# Patient Record
Sex: FEMALE | Race: WHITE | NOT HISPANIC OR LATINO | Employment: PART TIME | URBAN - METROPOLITAN AREA
[De-identification: names, ages, dates, MRNs, and addresses within clinical notes are randomized per-mention and may not be internally consistent; named-entity substitution may affect disease eponyms.]

---

## 2017-01-11 ENCOUNTER — TRANSCRIBE ORDERS (OUTPATIENT)
Dept: LAB | Facility: CLINIC | Age: 22
End: 2017-01-11

## 2017-01-11 ENCOUNTER — LAB (OUTPATIENT)
Dept: LAB | Facility: CLINIC | Age: 22
End: 2017-01-11
Payer: COMMERCIAL

## 2017-01-11 DIAGNOSIS — N94.6 MENORRHALGIA: ICD-10-CM

## 2017-01-11 DIAGNOSIS — D53.9 UNSPECIFIED DEFICIENCY ANEMIA: Primary | ICD-10-CM

## 2017-01-11 DIAGNOSIS — D53.9 UNSPECIFIED DEFICIENCY ANEMIA: ICD-10-CM

## 2017-01-11 LAB
ALBUMIN SERPL BCP-MCNC: 3.2 G/DL (ref 3.5–5)
ALP SERPL-CCNC: 39 U/L (ref 46–116)
ALT SERPL W P-5'-P-CCNC: 17 U/L (ref 12–78)
ANION GAP SERPL CALCULATED.3IONS-SCNC: 9 MMOL/L (ref 4–13)
AST SERPL W P-5'-P-CCNC: 15 U/L (ref 5–45)
BASOPHILS # BLD AUTO: 0.07 THOUSANDS/ΜL (ref 0–0.1)
BASOPHILS NFR BLD AUTO: 1 % (ref 0–1)
BILIRUB SERPL-MCNC: 0.5 MG/DL (ref 0.2–1)
BUN SERPL-MCNC: 16 MG/DL (ref 5–25)
CALCIUM SERPL-MCNC: 8.8 MG/DL (ref 8.3–10.1)
CHLORIDE SERPL-SCNC: 105 MMOL/L (ref 100–108)
CO2 SERPL-SCNC: 26 MMOL/L (ref 21–32)
CREAT SERPL-MCNC: 0.77 MG/DL (ref 0.6–1.3)
EOSINOPHIL # BLD AUTO: 0.53 THOUSAND/ΜL (ref 0–0.61)
EOSINOPHIL NFR BLD AUTO: 9 % (ref 0–6)
ERYTHROCYTE [DISTWIDTH] IN BLOOD BY AUTOMATED COUNT: 16.9 % (ref 11.6–15.1)
GFR SERPL CREATININE-BSD FRML MDRD: >60 ML/MIN/1.73SQ M
GLUCOSE SERPL-MCNC: 83 MG/DL (ref 65–140)
HCT VFR BLD AUTO: 34.5 % (ref 34.8–46.1)
HGB BLD-MCNC: 10.7 G/DL (ref 11.5–15.4)
IRON SERPL-MCNC: 36 UG/DL (ref 50–170)
LYMPHOCYTES # BLD AUTO: 2.37 THOUSANDS/ΜL (ref 0.6–4.47)
LYMPHOCYTES NFR BLD AUTO: 40 % (ref 14–44)
MCH RBC QN AUTO: 24.7 PG (ref 26.8–34.3)
MCHC RBC AUTO-ENTMCNC: 31 G/DL (ref 31.4–37.4)
MCV RBC AUTO: 80 FL (ref 82–98)
MONOCYTES # BLD AUTO: 0.57 THOUSAND/ΜL (ref 0.17–1.22)
MONOCYTES NFR BLD AUTO: 10 % (ref 4–12)
NEUTROPHILS # BLD AUTO: 2.43 THOUSANDS/ΜL (ref 1.85–7.62)
NEUTS SEG NFR BLD AUTO: 40 % (ref 43–75)
PLATELET # BLD AUTO: 306 THOUSANDS/UL (ref 149–390)
PMV BLD AUTO: 11.2 FL (ref 8.9–12.7)
POTASSIUM SERPL-SCNC: 4 MMOL/L (ref 3.5–5.3)
PROT SERPL-MCNC: 7.1 G/DL (ref 6.4–8.2)
RBC # BLD AUTO: 4.33 MILLION/UL (ref 3.81–5.12)
SODIUM SERPL-SCNC: 140 MMOL/L (ref 136–145)
WBC # BLD AUTO: 5.97 THOUSAND/UL (ref 4.31–10.16)

## 2017-01-11 PROCEDURE — 85025 COMPLETE CBC W/AUTO DIFF WBC: CPT

## 2017-01-11 PROCEDURE — 83540 ASSAY OF IRON: CPT

## 2017-01-11 PROCEDURE — 80053 COMPREHEN METABOLIC PANEL: CPT

## 2017-01-11 PROCEDURE — 36415 COLL VENOUS BLD VENIPUNCTURE: CPT

## 2017-02-13 ENCOUNTER — ALLSCRIPTS OFFICE VISIT (OUTPATIENT)
Dept: OTHER | Facility: OTHER | Age: 22
End: 2017-02-13

## 2017-02-15 LAB
CHLAMYDIA, LIQUID-BASED (HISTORICAL): NOT DETECTED
GC BY MOL. METHOD (HISTORICAL): NOT DETECTED

## 2017-03-04 ENCOUNTER — TRANSCRIBE ORDERS (OUTPATIENT)
Dept: LAB | Facility: CLINIC | Age: 22
End: 2017-03-04

## 2017-03-04 ENCOUNTER — APPOINTMENT (OUTPATIENT)
Dept: LAB | Facility: CLINIC | Age: 22
End: 2017-03-04
Payer: COMMERCIAL

## 2017-03-04 DIAGNOSIS — R53.81 OTHER MALAISE AND FATIGUE: ICD-10-CM

## 2017-03-04 DIAGNOSIS — R53.83 OTHER MALAISE AND FATIGUE: ICD-10-CM

## 2017-03-04 DIAGNOSIS — E55.9 UNSPECIFIED VITAMIN D DEFICIENCY: Primary | ICD-10-CM

## 2017-03-04 DIAGNOSIS — E55.9 UNSPECIFIED VITAMIN D DEFICIENCY: ICD-10-CM

## 2017-03-04 LAB
25(OH)D3 SERPL-MCNC: 24.6 NG/ML (ref 30–100)
T3FREE SERPL-MCNC: 2.6 PG/ML (ref 2.91–4.53)
T4 FREE SERPL-MCNC: 0.97 NG/DL (ref 0.78–1.33)
TSH SERPL DL<=0.05 MIU/L-ACNC: 2.03 UIU/ML (ref 0.46–3.98)

## 2017-03-04 PROCEDURE — 84443 ASSAY THYROID STIM HORMONE: CPT

## 2017-03-04 PROCEDURE — 86376 MICROSOMAL ANTIBODY EACH: CPT

## 2017-03-04 PROCEDURE — 86618 LYME DISEASE ANTIBODY: CPT

## 2017-03-04 PROCEDURE — 84439 ASSAY OF FREE THYROXINE: CPT

## 2017-03-04 PROCEDURE — 86665 EPSTEIN-BARR CAPSID VCA: CPT

## 2017-03-04 PROCEDURE — 84481 FREE ASSAY (FT-3): CPT

## 2017-03-04 PROCEDURE — 86038 ANTINUCLEAR ANTIBODIES: CPT

## 2017-03-04 PROCEDURE — 36415 COLL VENOUS BLD VENIPUNCTURE: CPT

## 2017-03-04 PROCEDURE — 86663 EPSTEIN-BARR ANTIBODY: CPT

## 2017-03-04 PROCEDURE — 86664 EPSTEIN-BARR NUCLEAR ANTIGEN: CPT

## 2017-03-04 PROCEDURE — 82306 VITAMIN D 25 HYDROXY: CPT

## 2017-03-05 LAB — THYROPEROXIDASE AB SERPL-ACNC: 19 IU/ML (ref 0–34)

## 2017-03-06 LAB
B BURGDOR IGG SER IA-ACNC: 0.16
B BURGDOR IGM SER IA-ACNC: 0.33
EBV EA IGG SER-ACNC: <9 U/ML (ref 0–8.9)
EBV NA IGG SER IA-ACNC: 421 U/ML (ref 0–17.9)
EBV PATRN SPEC IB-IMP: ABNORMAL
EBV VCA IGG SER IA-ACNC: 332 U/ML (ref 0–17.9)
EBV VCA IGM SER IA-ACNC: <36 U/ML (ref 0–35.9)
RYE IGE QN: NEGATIVE

## 2017-04-06 ENCOUNTER — ALLSCRIPTS OFFICE VISIT (OUTPATIENT)
Dept: OTHER | Facility: OTHER | Age: 22
End: 2017-04-06

## 2017-04-06 DIAGNOSIS — K22.4 DYSKINESIA OF ESOPHAGUS: ICD-10-CM

## 2017-05-19 ENCOUNTER — HOSPITAL ENCOUNTER (OUTPATIENT)
Dept: RADIOLOGY | Facility: HOSPITAL | Age: 22
Discharge: HOME | End: 2017-05-19
Payer: COMMERCIAL

## 2017-05-19 DIAGNOSIS — K22.4 DYSKINESIA OF ESOPHAGUS: ICD-10-CM

## 2017-05-19 PROCEDURE — 74249 HB CONTRST X-RAY UPPR GI TRACT (SMALL INTESTINE FOLLOW-THROUGH): CPT

## 2017-05-21 ENCOUNTER — GENERIC CONVERSION - ENCOUNTER (OUTPATIENT)
Dept: OTHER | Facility: OTHER | Age: 22
End: 2017-05-21

## 2017-05-22 ENCOUNTER — TRANSCRIBE ORDERS (OUTPATIENT)
Dept: ADMINISTRATIVE | Facility: HOSPITAL | Age: 22
End: 2017-05-22

## 2017-05-22 DIAGNOSIS — R40.0 SOMNOLENCE: Primary | ICD-10-CM

## 2017-06-06 ENCOUNTER — TRANSCRIBE ORDERS (OUTPATIENT)
Dept: SLEEP CENTER | Facility: CLINIC | Age: 22
End: 2017-06-06

## 2017-06-06 DIAGNOSIS — G47.33 OSA (OBSTRUCTIVE SLEEP APNEA): Primary | ICD-10-CM

## 2017-07-12 ENCOUNTER — TRANSCRIBE ORDERS (OUTPATIENT)
Dept: LAB | Facility: CLINIC | Age: 22
End: 2017-07-12

## 2017-07-12 ENCOUNTER — LAB (OUTPATIENT)
Dept: LAB | Facility: CLINIC | Age: 22
End: 2017-07-12
Payer: COMMERCIAL

## 2017-07-12 DIAGNOSIS — Z02.83 ENCOUNTER FOR BLOOD-ALCOHOL AND BLOOD-DRUG TEST: ICD-10-CM

## 2017-07-12 PROCEDURE — 80307 DRUG TEST PRSMV CHEM ANLYZR: CPT

## 2017-07-13 LAB
AMPHETAMINES UR QL SCN: NEGATIVE NG/ML
BARBITURATES UR QL SCN: NEGATIVE NG/ML
BENZODIAZ UR QL SCN: NEGATIVE NG/ML
BZE UR QL SCN: NEGATIVE NG/ML
CANNABINOIDS UR QL SCN: NEGATIVE NG/ML
METHADONE UR QL SCN: NEGATIVE NG/ML
OPIATES UR QL: NEGATIVE NG/ML
PCP UR QL: NEGATIVE NG/ML
PROPOXYPH UR QL: NEGATIVE NG/ML

## 2017-07-18 ENCOUNTER — TRANSCRIBE ORDERS (OUTPATIENT)
Dept: SLEEP CENTER | Facility: CLINIC | Age: 22
End: 2017-07-18

## 2017-07-18 DIAGNOSIS — G47.33 OSA (OBSTRUCTIVE SLEEP APNEA): Primary | ICD-10-CM

## 2017-07-19 ENCOUNTER — TRANSCRIBE ORDERS (OUTPATIENT)
Dept: SLEEP CENTER | Facility: CLINIC | Age: 22
End: 2017-07-19

## 2017-07-19 ENCOUNTER — HOSPITAL ENCOUNTER (OUTPATIENT)
Dept: SLEEP CENTER | Facility: CLINIC | Age: 22
Discharge: HOME | End: 2017-07-19
Payer: COMMERCIAL

## 2017-07-19 DIAGNOSIS — G47.33 OSA (OBSTRUCTIVE SLEEP APNEA): ICD-10-CM

## 2017-07-19 DIAGNOSIS — G47.411 PRIMARY NARCOLEPSY WITH CATAPLEXY: Primary | ICD-10-CM

## 2017-07-20 ENCOUNTER — HOSPITAL ENCOUNTER (OUTPATIENT)
Dept: SLEEP CENTER | Facility: CLINIC | Age: 22
Discharge: HOME | End: 2017-07-20
Payer: COMMERCIAL

## 2017-07-20 ENCOUNTER — ALLSCRIPTS OFFICE VISIT (OUTPATIENT)
Dept: OTHER | Facility: OTHER | Age: 22
End: 2017-07-20

## 2017-07-20 DIAGNOSIS — G47.411 PRIMARY NARCOLEPSY WITH CATAPLEXY: ICD-10-CM

## 2017-07-20 PROCEDURE — 95810 POLYSOM 6/> YRS 4/> PARAM: CPT

## 2017-07-21 ENCOUNTER — HOSPITAL ENCOUNTER (OUTPATIENT)
Dept: SLEEP CENTER | Facility: CLINIC | Age: 22
Discharge: HOME | End: 2017-07-21
Payer: COMMERCIAL

## 2017-07-21 DIAGNOSIS — G47.419 PRIMARY NARCOLEPSY WITHOUT CATAPLEXY: ICD-10-CM

## 2017-07-21 PROCEDURE — 80307 DRUG TEST PRSMV CHEM ANLYZR: CPT | Performed by: INTERNAL MEDICINE

## 2017-07-21 PROCEDURE — 95805 MULTIPLE SLEEP LATENCY TEST: CPT

## 2017-07-26 ENCOUNTER — TRANSCRIBE ORDERS (OUTPATIENT)
Dept: SLEEP CENTER | Facility: CLINIC | Age: 22
End: 2017-07-26

## 2017-07-26 DIAGNOSIS — G47.419 PRIMARY NARCOLEPSY WITHOUT CATAPLEXY: Primary | ICD-10-CM

## 2017-08-08 ENCOUNTER — TRANSCRIBE ORDERS (OUTPATIENT)
Dept: SLEEP CENTER | Facility: CLINIC | Age: 22
End: 2017-08-08

## 2017-08-08 ENCOUNTER — HOSPITAL ENCOUNTER (OUTPATIENT)
Dept: SLEEP CENTER | Facility: CLINIC | Age: 22
Discharge: HOME | End: 2017-08-08
Payer: COMMERCIAL

## 2017-08-08 DIAGNOSIS — G47.419 PRIMARY NARCOLEPSY WITHOUT CATAPLEXY: Primary | ICD-10-CM

## 2017-08-08 DIAGNOSIS — G47.419 PRIMARY NARCOLEPSY WITHOUT CATAPLEXY: ICD-10-CM

## 2017-08-11 ENCOUNTER — GENERIC CONVERSION - ENCOUNTER (OUTPATIENT)
Dept: OTHER | Facility: OTHER | Age: 22
End: 2017-08-11

## 2017-08-15 ENCOUNTER — ALLSCRIPTS OFFICE VISIT (OUTPATIENT)
Dept: OTHER | Facility: OTHER | Age: 22
End: 2017-08-15

## 2017-09-22 ENCOUNTER — ALLSCRIPTS OFFICE VISIT (OUTPATIENT)
Dept: OTHER | Facility: OTHER | Age: 22
End: 2017-09-22

## 2017-10-17 ENCOUNTER — TRANSCRIBE ORDERS (OUTPATIENT)
Dept: SLEEP CENTER | Facility: CLINIC | Age: 22
End: 2017-10-17

## 2017-10-17 ENCOUNTER — HOSPITAL ENCOUNTER (OUTPATIENT)
Dept: SLEEP CENTER | Facility: CLINIC | Age: 22
Discharge: HOME | End: 2017-10-17
Payer: COMMERCIAL

## 2017-10-17 DIAGNOSIS — G47.33 OSA (OBSTRUCTIVE SLEEP APNEA): Primary | ICD-10-CM

## 2017-10-17 DIAGNOSIS — G47.419 PRIMARY NARCOLEPSY WITHOUT CATAPLEXY: ICD-10-CM

## 2017-10-17 NOTE — PROGRESS NOTES
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, type 1    Assessment:  The patient is doing much better on Xyrem 3 g twice nightly  She was able to reduce her dosage of Nuvigil, which in turn resulted in less headache  She asked about an alternative to [place], which does not seem to last long enough  I am hopeful that she will need very little in the way of stimulants moving forward with continued use of Xyrem  I will however start her on methylphenidate ER 10 mg p  o  b i d  as needed  Plan:  Continue Xyrem 3 g twice nightly and start methylphenidate ER 10 mg p  o  b i d  as needed  Discontinue Nuvigil    Follow up:  6 months    History of Present Illness: 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  She is much improved since starting Xyrem at 3 g twice nightly  She previously did reasonably well with Nuvigil, although she was experiencing severe headaches  I told her that she could use Tylenol as needed with Nuvigil, but we will instead switch her to methylphenidate ER  She denies any cataplexy, sleep paralysis or hypnagogic hallucinations since her last visit  Of note, once shipment of the patient's Xyrem did not seem to contain the full amount of medication  The solution tasted less salty and was less effective  Historical Information    Past Medical History: No past medical history on file  Past Surgical History: No past surgical history on file  Social History - see chart  History   Alcohol use: Not on file     History   Drug Use Not on file     History   Smoking Status    Not on file   Smokeless Tobacco    Not on file     Family History: No family history on file  Medications/Allergies:    No current outpatient prescriptions on file        Objective    Vital Signs:   See Chart    Physical Exam:    General: Alert, appropriate, cooperative, normal build    Head: NC/AT    Skin: Warm, dry    Neuro: No motor abnormalities, cranial nerves appear intact    Psych: Normal affect      Counseling / Coordination of Care  Total clinic time spent today 15 minutes  Greater than 50% of total time was spent with the patient and / or family counseling and / or coordination of care  A description of the counseling / coordination of care: treatment was discussed    EDGAR Caban    Board Certified Sleep Specialist

## 2018-01-09 NOTE — PROGRESS NOTES
Assessment    1  Chlamydial infection of lower genitourinary tract (276 53) (A56 00)    Plan   Chlamydial infection of lower genitourinary tract    · Follow-up PRN Evaluation and Treatment  Follow-up  Status: Complete  Done:  [id number]   Ordered; For: Chlamydial infection of lower genitourinary tract; Ordered By: Fritz Anguiano Performed:  Due: 79ZPQ4948    (B) CHLAMYDIA, LIQUID-BASED; Status:Resulted - Requires Verification;   Done: 23YDC2049 12:00AM  [id number]; Ordered; For:Chlamydial infection of lower genitourinary tract; Ordered By:Bree Martinez;      Discussion/Summary  Discussion Summary:   JENNIFER done - treat based on results; Maintain safe sexual practices  RTO for APE or sooner if needed  Goals and Barriers: The patient has the current Goals: JENNIFER for chlamydai  The patent has the current Barriers: None  Patient's Capacity to Self-Care: Patient is able to Self-Care  Medication SE Review and Pt Understands Tx: The treatment plan was reviewed with the patient/guardian  The patient/guardian understands and agrees with the treatment plan   Counseling Documentation With Imm: The patient was counseled regarding diagnostic results, instructions for management, prognosis  total time of encounter was 10 minutes  Self Referrals:   Self Referrals: No   Transitional Care: Follow up with PCP/Referring Provider  Chief Complaint  Chief Complaint Free Text Note Form: 22 yr old here for [facility] for chlamydia      History of Present Illness  HPI: Pt presents to the office today for [facility] after diagnosed and treated for chlamydia in December; No vulvo-vaginal itch/burn/abn discharge or odor; No urinary sx - burning/pain/frequency/hematuria; NO abd/pelvic pain or fever/chills;  Pt finished complete course of antibiotic no issues and partner treated as well; Pt has not had IC since treated      Review of Systems   Female ROS: no pelvic pain, no pelvic pressure, no vaginal pain, no vaginal discharge, no vaginal itching, no vaginal lump or mass, no vaginal odor, no vulvar pain, no vulvar itching, no vulvar lump or mass, no labial swelling, no dysuria, no bladder pain, no hematuria, no burning sensation during urination, no change in urinary frequency, no feelings of urinary urgency and no urinary hesitancy  Focused-Female:   Constitutional: No fever, no chills, feels well, no tiredness, no recent weight gain or loss  Gastrointestinal: no abdominal pain  Genitourinary: no dysuria, no pelvic pain and no vaginal discharge  Active Problems    1  Chlamydial infection of lower genitourinary tract (099 53) (A56 00)   2  Deficiency anemia (281 9) (D53 9)   3  Dysmenorrhea (625 3) (N94 6)   4  Encounter for gynecological examination without abnormal finding (V72 31) (Z01 419)   5  Encounter for surveillance of other contraceptive (V25 49) (Z30 49)   6  Screening for STD (sexually transmitted disease) (V74 5) (Z11 3)    Past Medical History    1  History of  0 (V49 89) (Z78 9)   2  History of Hearing impairment, bilateral (389 9) (H91 93)  Active Problems And Past Medical History Reviewed: The active problems and past medical history were reviewed and updated today  Surgical History    1  History of Oral Surgery Tooth Extraction [place] Tooth  Surgical History Reviewed: The surgical history was reviewed and updated today  Family History  Father    1  Family history of Benign essential hypertension  Maternal Grandmother    2  Family history of diabetes mellitus (V18 0) (Z83 3)  Maternal Great Grandmother    3  Family history of malignant neoplasm of female breast (V16 3) (Z80 3)  Family History Reviewed: The family history was reviewed and updated today  Social History    · Feels safe at home   · Never a smoker   · No drug use   · Primary spoken language English   · Single   · Social alcohol use (Z78 9)   · Student  Social History Reviewed: The social history was reviewed and updated today   The social history was reviewed and is unchanged  Current Meds   1  [employer] Vitamin D3 1000 UNIT CAPS; Therapy: (Recorded:61Uld2640) to Recorded   2  Iron TABS; Therapy: (Recorded:17Xju5423) to Recorded   3  Kariva 0 15-0 02/0 01 MG (21/5) Oral Tablet; 1 tab po q day; Therapy: 36IYU9976 to (Last Rx:56Qvh3866)  Requested for: 09Spc5924 Ordered    Allergies    1  Albuterol AERS    Vitals  Vital Signs    Recorded: 77YRU8596 07:32NC   Systolic 829, LUE, Sitting   Diastolic 80, LUE, Sitting   Height 5 ft 9 in   Weight 157 lb    BMI Calculated 23 18   BSA Calculated 1 86     Physical Exam  vitals r/pratik   Constitutional   General appearance: No acute distress, well appearing and well nourished  Genitourinary   External genitalia: Normal and no lesions appreciated  Vagina: Normal, no lesions or dryness appreciated  Urethral meatus: Normal     Psychiatric   Mood and affect: Normal        Attending Note  Collaborating Physician Note: Collaborating Physician: I discussed the case with the Advanced Practitioner and reviewed the note, I supervised the Advanced Practitioner and I agree with the Advanced Practitioner note  Future Appointments    Date/Time Provider Specialty Site   04/06/2017 03:00 PM Milagro Ventura DO Family Medicine [facility] [facility]     Signatures   Electronically signed by :  Shelby Chavarria [facility]; Feb 13 2017  9:21AM EST                       (Author)    Electronically signed by : EDGAR Chu ; Mar 10 2017  8:45PM EST                       (Author)

## 2018-01-11 NOTE — PROGRESS NOTES
Assessment    1  Encounter for preventive health examination (V70 0) (Z00 00)    Discussion/Summary  health maintenance visit cervical cancer screening is current Breast cancer screening: breast cancer screening is not indicated  Colorectal cancer screening: colorectal cancer screening is not indicated  Osteoporosis screening: bone mineral density testing is not indicated  The immunizations are up to date  Patient's forms were completed and scanned into the computer  Possible side effects of new medications were reviewed with the patient/guardian today  The treatment plan was reviewed with the patient/guardian  The patient/guardian understands and agrees with the treatment plan      Chief Complaint  SCHOOL PHYSICAL      History of Present Illness  HM, Adult Female: The patient is being seen for a health maintenance evaluation  The last health maintenance visit was 1 year(s) ago  General Health: The patient's health since the last visit is described as good  She has regular dental visits  She denies vision problems  She denies hearing loss  Immunizations status: up to date  Lifestyle:  She consumes a diverse and healthy diet  She does not have any weight concerns  She exercises regularly  She does not use tobacco  She denies alcohol use  She denies drug use  Reproductive health: the patient is premenopausal   she reports normal menses  Screening: cancer screening reviewed and current  metabolic screening reviewed and current  risk screening reviewed and current  HPI: The patient presents for a physical for an athletic training program  She is up-to-date with all of her vaccines at the present time and denies any new complaints  Review of Systems    Constitutional: No fever, no chills, feels well, no tiredness, no recent weight gain or weight loss  Eyes: No complaints of eye pain, no red eyes, no eyesight problems, no discharge, no dry eyes, no itching of eyes     ENT: no complaints of earache, no loss of hearing, no nose bleeds, no nasal discharge, no sore throat, no hoarseness  Cardiovascular: No complaints of slow heart rate, no fast heart rate, no chest pain, no palpitations, no leg claudication, no lower extremity edema  Respiratory: No complaints of shortness of breath, no wheezing, no cough, no SOB on exertion, no orthopnea, no PND  Gastrointestinal: No complaints of abdominal pain, no constipation, no nausea or vomiting, no diarrhea, no bloody stools  Genitourinary: No complaints of dysuria, no incontinence, no pelvic pain, no dysmenorrhea, no vaginal discharge or bleeding  Musculoskeletal: No complaints of arthralgias, no myalgias, no joint swelling or stiffness, no limb pain or swelling  Integumentary: No complaints of skin rash or lesions, no itching, no skin wounds, no breast pain or lump  Neurological: No complaints of headache, no confusion, no convulsions, no numbness, no dizziness or fainting, no tingling, no limb weakness, no difficulty walking  Psychiatric: Not suicidal, no sleep disturbance, no anxiety or depression, no change in personality, no emotional problems  Endocrine: No complaints of proptosis, no hot flashes, no muscle weakness, no deepening of the voice, no feelings of weakness  Hematologic/Lymphatic: No complaints of swollen glands, no swollen glands in the neck, does not bleed easily, does not bruise easily  Active Problems    1  Chlamydial infection of lower genitourinary tract (099 53) (A56 00)   2  Daytime somnolence (780 54) (R40 0)   3  Deficiency anemia (281 9) (D53 9)   4  Dysmenorrhea (625 3) (N94 6)   5  Encounter for drug screening (V72 85) (Z02 83)   6  Encounter for gynecological examination without abnormal finding (V72 31) (Z01 419)   7  Encounter for surveillance of other contraceptive (V25 49) (Z30 49)   8  Esophageal spasm (530 5) (K22 4)   9   Screening for STD (sexually transmitted disease) (V74 5) (Z11 3)    Past Medical History    · History of  0 (V49 89) (Z78 9)   · History of Hearing impairment, bilateral (389 9) (H91 93)    Surgical History    · History of Ear Pressure Eq Tube Insertion Benefits: Remove Chronic Flui   · History of Oral Surgery Tooth Extraction [place] Tooth    Family History  Mother    · Family history of Type 2 diabetes mellitus without complication  Father    · Family history of Benign essential hypertension  Maternal Grandmother    · Family history of diabetes mellitus (V18 0) (Z83 3)  Maternal Great Grandmother    · Family history of Ovarian cancer   · Family history of Type 2 diabetes mellitus without complication    Social History    · Feels safe at home   · Never a smoker   · No drug use   · Primary spoken language English   · Single   · Social alcohol use (Z78 9)   · Student    Current Meds   1  [employer]S Vitamin D3 1000 UNIT CAPS; Therapy: (Recorded:90Fyp7765) to Recorded   2  Iron TABS; Therapy: (Recorded:53Wqd8461) to Recorded   3  Kariva 0 15-0 02/0 01 MG () Oral Tablet; 1 tab po q day; Therapy: 55XCI9209 to (Last Rx:02Fhc0943)  Requested for: 78AKT3790 Ordered   4  Pantoprazole Sodium 40 MG Oral Tablet Delayed Release; TAKE 1 TABLET DAILY; Therapy: 86NJP2746 to (Evaluate:29Kxz7803)  Requested for: 83FYG8542; Last   Rx:39Kqd7853 Ordered    Allergies    1  Albuterol AERS    Vitals   Recorded: 05Mct0340 01:22PM   Temperature 97 2 F   Heart Rate 74   Systolic 532   Diastolic 80   Height 5 ft 9 in   Weight 161 lb 8 oz   BMI Calculated 23 85   BSA Calculated 1 89     Physical Exam    Constitutional   General appearance: No acute distress, well appearing and well nourished  Head and Face   Head and face: Normal     Palpation of the face and sinuses: No sinus tenderness  Eyes   Conjunctiva and lids: No swelling, erythema or discharge  Pupils and irises: Equal, round, reactive to light  Ophthalmoscopic examination: Normal fundi and optic discs      Ears, Nose, Mouth, and Throat External inspection of ears and nose: Normal     Otoscopic examination: Tympanic membranes translucent with normal light reflex  Canals patent without erythema  Hearing: Normal     Nasal mucosa, septum, and turbinates: Normal without edema or erythema  Lips, teeth, and gums: Normal, good dentition  Oropharynx: Normal with no erythema, edema, exudate or lesions  Neck   Neck: Supple, symmetric, trachea midline, no masses  Thyroid: Normal, no thyromegaly  Pulmonary   Respiratory effort: No increased work of breathing or signs of respiratory distress  Auscultation of lungs: Clear to auscultation  Cardiovascular   Auscultation of heart: Normal rate and rhythm, normal S1 and S2, no murmurs  Carotid pulses: 2+ bilaterally  Pedal pulses: 2+ bilaterally  Examination of extremities for edema and/or varicosities: Normal     Abdomen   Abdomen: Non-tender, no masses  Liver and spleen: No hepatomegaly or splenomegaly  Lymphatic   Palpation of lymph nodes in neck: No lymphadenopathy  Musculoskeletal   Digits and nails: Normal without clubbing or cyanosis  Skin   Skin and subcutaneous tissue: Normal without rashes or lesions  Palpation of skin and subcutaneous tissue: Normal turgor  Neurologic   Reflexes: 2+ and symmetric  Psychiatric   Judgment and insight: Normal     Orientation to person, place, and time: Normal     Recent and remote memory: Intact  Mood and affect: Normal        Results/Data  PHQ-2 Adult Depression Screening 18Dmh9073 01:26PM User, s     Test Name Result Flag Reference   PHQ-2 Adult Depression Score 0     Over the last two weeks, how often have you been bothered by any of the following problems?   Little interest or pleasure in doing things: Not at all - 0  Feeling down, depressed, or hopeless: Not at all - 0   PHQ-2 Adult Depression Screening Negative         Future Appointments    Date/Time Provider Specialty Site   08/15/2017 08:30 AM Candy Ronald Mckee, [facility] Obstetrics/Gynecology [facility] [facility]     Signatures   Electronically signed by : Emilee Lambert DO; Jul 20 2017  3:31PM EST                       (Author)

## 2018-01-12 VITALS
DIASTOLIC BLOOD PRESSURE: 80 MMHG | SYSTOLIC BLOOD PRESSURE: 118 MMHG | HEIGHT: 69 IN | WEIGHT: 157 LBS | BODY MASS INDEX: 23.25 KG/M2

## 2018-01-12 NOTE — RESULT NOTES
Verified Results  FL UPPER GI / SMALL BOWEL WITH AIR [id number] 08:14AM [facility] Order Number: [id number]    - Patient Instructions: To schedule this appointment, please contact Central Scheduling at [phone]  Test Name Result Flag Reference   FL UPPER GI / SMALL BOWEL WITH AIR (Report)     UPPER GI AND SMALL BOWEL FOLLOW THROUGH DOUBLE CONTRAST     INDICATION: Abdominal pain, food gets stuck in center of chest, constipation, cramping 2 5 years     COMPARISON:  None     IMAGES: 53     FLUOROSCOPY TIME: 3 6 minutes     TECHNIQUE:   view of the abdomen was obtained and is unremarkable  Upper GI was performed utilizing effervescent granules and barium orally to the patient  Subsequently, a small bowel follow through was performed including spot images of the terminal ileum  FINDINGS:     The esophagus is normal in caliber  Esophageal motility is normal and emptying of contrast from the esophagus is prompt  There is no mucosal mass, ulceration or fold thickening identified  The stomach is unremarkable in size  The gastric mucosa is normal  No penetrating ulcers or masses  Contrast empties promptly into the duodenum  The duodenum is normal in caliber  The ligament of Treitz/duodenojejunal junction lies in a normal position  Gastroesophageal reflux was observed to the level of the upper esophagus  There is no hiatal hernia  Normal caliber small bowel loops  There is a normal fold pattern and thickness  Dense contrast obscures fine mucosal detail  No intraluminal filling defects, bowel kinking or fistula  Terminal ileum is obscured by surrounding bowel loops because of its location in the deep pelvis  Transit time to the colon was approximately 30 minutes  IMPRESSION:   1  Reflux to the upper esophagus  2  Terminal ileum is not well evaluated due to its location in the deep pelvis   Otherwise unremarkable double contrast upper GI and small bowel follow through         Workstation performed: [id number]     Signed by:   Job Munson MD   5/19/17

## 2018-01-13 VITALS
HEIGHT: 68 IN | SYSTOLIC BLOOD PRESSURE: 108 MMHG | WEIGHT: 153.38 LBS | HEART RATE: 76 BPM | BODY MASS INDEX: 23.25 KG/M2 | TEMPERATURE: 98.1 F | DIASTOLIC BLOOD PRESSURE: 80 MMHG

## 2018-01-14 VITALS
BODY MASS INDEX: 24.1 KG/M2 | DIASTOLIC BLOOD PRESSURE: 84 MMHG | SYSTOLIC BLOOD PRESSURE: 124 MMHG | WEIGHT: 159 LBS | HEIGHT: 68 IN

## 2018-01-14 VITALS
SYSTOLIC BLOOD PRESSURE: 122 MMHG | HEART RATE: 77 BPM | TEMPERATURE: 98 F | BODY MASS INDEX: 23.65 KG/M2 | DIASTOLIC BLOOD PRESSURE: 96 MMHG | RESPIRATION RATE: 16 BRPM | WEIGHT: 160.13 LBS

## 2018-01-14 VITALS
TEMPERATURE: 97.2 F | BODY MASS INDEX: 23.92 KG/M2 | HEART RATE: 74 BPM | WEIGHT: 161.5 LBS | DIASTOLIC BLOOD PRESSURE: 80 MMHG | SYSTOLIC BLOOD PRESSURE: 122 MMHG | HEIGHT: 69 IN

## 2018-01-15 NOTE — PROGRESS NOTES
Assessment    1  Dysmenorrhea (625 3) (N94 6)    Plan  Deficiency anemia    · (1) COMPREHENSIVE METABOLIC PANEL; Status:Active; Requested for:96Idw9232;    Perform:[facility]; Due:22Aug2017;Ordered; For:Deficiency anemia; Ordered By:Everette Ackerman; Deficiency anemia, Dysmenorrhea    · (1) IRON; Status:Active; Requested for:22Aug2016;    Perform:[facility]; Due:22Aug2017;Ordered; For:Deficiency anemia, Dysmenorrhea; Ordered By:Kandy Ackerman;  Dysmenorrhea    · Kariva 0 15-0 02/0 01 MG (21/5) Oral Tablet; Take 1 tablet daily   Rx By: Lis Daugherty; Dispense: 0 Days ; #:84 Tablet; Refill: 3; For: Dysmenorrhea; GIN = N; Verified Transmission to [address] [place] [address]; Last Updated By: System, [employer]; 8/22/2016 9:57:13 AM   · (1) CBC/PLT/DIFF; Status:Active; Requested for:57Gel2286;    Perform:[facility]; Due:22Aug2017;Ordered; For:Dysmenorrhea; Ordered By:Everette Ackerman;    Discussion/Summary  Discussion Summary:   Auburn Dadelvin currently working for patient to control menses and dysmenorrhea without significant side effects  Patient would like to continue at this time  Patient given slips repeat labs in 3 months and patient will return for annual or sooner as needed  Chief Complaint  Chief Complaint Free Text Note Form: Patient here to follow-up on OCPs  History of Present Illness  HPI: Patient here to follow-up on OCPs  Patient reports side effects on Sprintec  Patient is doing much better on [place] Daunt and feels that this is significantly improved her menses her dysmenorrhea and heavy flow  Patient would like to continue Bijan Daunt at this time and repeat her labs in 3 months  Review of Systems  Focused-Female:   Constitutional: feeling tired  Cardiovascular: no complaints of slow or fast heart rate, no chest pain, no palpitations, no leg claudication or lower extremity edema  Respiratory: no complaints of shortness of breath, no wheezing, no dyspnea on exertion, no orthopnea or PND     Breasts: no complaints of breast pain, breast lump or nipple discharge  Gastrointestinal: no complaints of abdominal pain, no constipation, no nausea or diarrhea, no vomiting, no bloody stools  Genitourinary: no complaints of dysuria, no incontinence, no pelvic pain, no dysmenorrhea, no vaginal discharge or abnormal vaginal bleeding  Musculoskeletal: no complaints of arthralgia, no myalgia, no joint swelling or stiffness, no limb pain or swelling  Neurological: no complaints of headache, no confusion, no numbness or tingling, no dizziness or fainting  Active Problems    1  Dysmenorrhea (625 3) (N94 6)   2  Encounter for surveillance of other contraceptive (V25 49) (Z30 49)    Past Medical History    1  History of Hearing impairment, bilateral (389 9) (H91 93)    Family History  Father    1  Family history of Benign essential hypertension  Maternal Grandmother    2  Family history of diabetes mellitus (V18 0) (Z83 3)  Maternal Great Grandmother    3  Family history of malignant neoplasm of female breast (V16 3) (Z80 3)    Social History    · Feels safe at home   · Never a smoker   · No alcohol use   · No drug use   · Primary spoken language English   · Single   · Student    Current Meds   1  [employer] Vitamin D3 1000 UNIT CAPS; Therapy: (Recorded:66Rvb5708) to Recorded   2  Kariva 0 15-0 02/0 01 MG (21/5) Oral Tablet; take 1 tablet by mouth daily; Therapy: 23VER8184 to (Evaluate:90Yri0298)  Requested for: 0490 51 30 85; Last   Rx:21Oct2015 Ordered    Allergies    1   Albuterol AERS    Vitals  Vital Signs    Recorded: 22MDJ7366 79:63PD   Systolic 828, LUE, Standing   Diastolic 80, LUE, Standing   LMP 94Epo6740   Height 5 ft 9 in   Weight 155 lb    BMI Calculated 22 89   BSA Calculated 1 85     Future Appointments    Date/Time Provider Specialty Site   12/19/2016 09:00 AM Paolo Carrero, [facility] Obstetrics/Gynecology [address] [facility] OBGYN     Signatures   Electronically signed by : Carmella Hollis MD; Aug 25 2016 11: 46PM EST                       (Author)

## 2018-02-17 DIAGNOSIS — Z30.41 ENCOUNTER FOR SURVEILLANCE OF CONTRACEPTIVE PILLS: Primary | ICD-10-CM

## 2018-02-26 ENCOUNTER — TELEPHONE (OUTPATIENT)
Dept: OBGYN CLINIC | Facility: CLINIC | Age: 23
End: 2018-02-26

## 2018-02-26 NOTE — TELEPHONE ENCOUNTER
Pt called in with BTB on Kariva x 2 weeks; Extends pill pack out to avoid cramps, PMS, and history of narcolepsy; Currently on fourth pack and plans to take placebo week of current pack - has 2 more weeks of active pills and then will take placebo week of current pack to bring on period; Recommended finishing current pack out and return to monthly period x a couple packs to regulate out bleeding; Then start extending out again to every other month, then every third month   Pt to call back if problem persists or worsens

## 2018-03-02 RX ORDER — DESOG-E.ESTRADIOL/E.ESTRADIOL 21-5 (28)
TABLET ORAL
Qty: 84 TABLET | Refills: 2 | Status: SHIPPED | OUTPATIENT
Start: 2018-03-02 | End: 2018-04-17 | Stop reason: SDUPTHER

## 2018-03-05 DIAGNOSIS — K21.9 GASTROESOPHAGEAL REFLUX DISEASE WITHOUT ESOPHAGITIS: Primary | ICD-10-CM

## 2018-03-05 RX ORDER — PANTOPRAZOLE SODIUM 40 MG/1
40 TABLET, DELAYED RELEASE ORAL DAILY
Refills: 3 | COMMUNITY
Start: 2018-02-04 | End: 2018-03-05 | Stop reason: SDUPTHER

## 2018-03-05 RX ORDER — PANTOPRAZOLE SODIUM 40 MG/1
40 TABLET, DELAYED RELEASE ORAL DAILY
Qty: 30 TABLET | Refills: 3 | Status: SHIPPED | OUTPATIENT
Start: 2018-03-05 | End: 2018-09-05 | Stop reason: SDUPTHER

## 2018-04-06 ENCOUNTER — TELEPHONE (OUTPATIENT)
Dept: FAMILY MEDICINE CLINIC | Facility: CLINIC | Age: 23
End: 2018-04-06

## 2018-04-13 ENCOUNTER — TELEPHONE (OUTPATIENT)
Dept: FAMILY MEDICINE CLINIC | Facility: CLINIC | Age: 23
End: 2018-04-13

## 2018-04-13 DIAGNOSIS — G47.419 PRIMARY NARCOLEPSY WITHOUT CATAPLEXY: Primary | ICD-10-CM

## 2018-04-17 ENCOUNTER — OFFICE VISIT (OUTPATIENT)
Dept: SLEEP CENTER | Facility: CLINIC | Age: 23
End: 2018-04-17
Payer: COMMERCIAL

## 2018-04-17 ENCOUNTER — ANNUAL EXAM (OUTPATIENT)
Dept: OBGYN CLINIC | Facility: CLINIC | Age: 23
End: 2018-04-17
Payer: COMMERCIAL

## 2018-04-17 VITALS
HEART RATE: 80 BPM | BODY MASS INDEX: 22.93 KG/M2 | DIASTOLIC BLOOD PRESSURE: 82 MMHG | SYSTOLIC BLOOD PRESSURE: 118 MMHG | WEIGHT: 154.8 LBS | HEIGHT: 69 IN

## 2018-04-17 VITALS
BODY MASS INDEX: 23.11 KG/M2 | WEIGHT: 156 LBS | HEIGHT: 69 IN | DIASTOLIC BLOOD PRESSURE: 80 MMHG | SYSTOLIC BLOOD PRESSURE: 124 MMHG

## 2018-04-17 DIAGNOSIS — Z11.3 SCREENING EXAMINATION FOR VENEREAL DISEASE: ICD-10-CM

## 2018-04-17 DIAGNOSIS — G47.33 OSA (OBSTRUCTIVE SLEEP APNEA): ICD-10-CM

## 2018-04-17 DIAGNOSIS — Z01.419 ENCOUNTER FOR GYNECOLOGICAL EXAMINATION WITHOUT ABNORMAL FINDING: Primary | ICD-10-CM

## 2018-04-17 DIAGNOSIS — Z30.41 ENCOUNTER FOR SURVEILLANCE OF CONTRACEPTIVE PILLS: ICD-10-CM

## 2018-04-17 DIAGNOSIS — G47.419 PRIMARY NARCOLEPSY WITHOUT CATAPLEXY: ICD-10-CM

## 2018-04-17 PROBLEM — J01.90 ACUTE SINUSITIS: Status: ACTIVE | Noted: 2017-09-22

## 2018-04-17 PROBLEM — K22.4 ESOPHAGEAL SPASM: Status: ACTIVE | Noted: 2017-04-06

## 2018-04-17 PROBLEM — R40.0 DAYTIME SOMNOLENCE: Status: ACTIVE | Noted: 2017-04-06

## 2018-04-17 PROCEDURE — S0612 ANNUAL GYNECOLOGICAL EXAMINA: HCPCS | Performed by: PHYSICIAN ASSISTANT

## 2018-04-17 PROCEDURE — 99214 OFFICE O/P EST MOD 30 MIN: CPT | Performed by: INTERNAL MEDICINE

## 2018-04-17 RX ORDER — ARMODAFINIL 150 MG/1
TABLET ORAL
Refills: 0 | COMMUNITY
Start: 2018-03-29 | End: 2018-05-10 | Stop reason: SDUPTHER

## 2018-04-17 RX ORDER — DESOGESTREL AND ETHINYL ESTRADIOL 21-5 (28)
1 KIT ORAL DAILY
Qty: 84 TABLET | Refills: 3 | Status: SHIPPED | OUTPATIENT
Start: 2018-04-17 | End: 2019-04-04 | Stop reason: SDUPTHER

## 2018-04-17 NOTE — PROGRESS NOTES
Progress Note - Sleep Center   Yessica Melgar FBM:[birth year] MRN: [id number]      Reason for Visit:    25 y  o female with type 1 narcolepsy    Assessment:  The patient is doing well on a dosage of Xyrem 3 g twice nightly and Nuvigil 150 mg by mouth every morning  We discussed the potential for decreased effectiveness of estrogen based contraception with use of Nuvigil  I recommended that the patient use both oral contraceptives and some form of barrier contraception  Plan:  Continue same  She is graduating college and will be starting a full-time job within the next several months  I told her to take Nuvigil when she first wakes up in the morning  Follow up:  6 months    History of Present Illness: The patient has a history of type 1 narcolepsy which is effectively treated with Xyrem 3 g twice nightly and Nuvigil 150 mg by mouth every morning        Historical Information    Past Medical History:   Past Medical History:   Diagnosis Date    Hearing impaired person, bilateral     Narcolepsy          Past Surgical History:   Past Surgical History:   Procedure Laterality Date    TYMPANOSTOMY TUBE PLACEMENT      last assessed: 04/06/2017; remove chornic fluid    WISDOM TOOTH EXTRACTION           Social History - see chart  History   Alcohol use: Not on file     History   Drug Use Not on file     History   Smoking Status    Not on file   Smokeless Tobacco    Not on file     Family History:   Family History   Problem Relation Age of Onset    Diabetes type II Mother      without complications    Hypertension Father      benign essential     Diabetes Maternal Grandmother     Ovarian cancer Other     Diabetes type II Other      without complications       Medications/Allergies:      Current Outpatient Prescriptions:     Cholecalciferol (CVS VITAMIN D3) 1000 units capsule, Take by mouth, Disp: , Rfl:     desogestrel-ethinyl estradiol (KARIVA) 0 15-0 02/0 01 MG (21/5) per tablet, Take 1 tablet by mouth daily, Disp: 84 tablet, Rfl: 3    NUVIGIL 150 MG tablet, TK 1 T PO QAM, Disp: , Rfl: 0    pantoprazole (PROTONIX) 40 mg tablet, Take 1 tablet (40 mg total) by mouth daily, Disp: 30 tablet, Rfl: 3    Sodium Oxybate (XYREM) 500 MG/ML SOLN, Take by mouth, Disp: , Rfl:       Objective    Vital Signs:   Vitals:    04/17/18 1000   BP: 118/82   Pulse: 80   Weight: 70 2 kg (154 lb 12 8 oz)   Height: 5' 9" (1 753 m)     [place] Sleepiness Scale: Total score: 7  Review of Systems      Genitourinary none   Cardiology none   Gastrointestinal heartburn/acid reflux   Neurology forgetfulness   Constitutional none   Integumentary none   Psychiatry none   Musculoskeletal none   Pulmonary none   ENT none   Endocrine none   Hematological none         Physical Exam:    General: Alert, appropriate, cooperative, overweight    Head: NC/AT    Skin: Warm, dry    Neuro: No motor abnormalities, cranial nerves appear intact    Psych: Normal affect      Counseling / Coordination of Care  Total clinic time spent today 15 minutes  Greater than 50% of total time was spent with the patient and / or family counseling and / or coordination of care  A description of the counseling / coordination of care: treatment was discussed    EDGAR Lanier    Board Certified Sleep Specialist

## 2018-04-17 NOTE — PROGRESS NOTES
Assessment/Plan   Problem List Items Addressed This Visit     Encounter for gynecological examination without abnormal finding - Primary     Pap guidelines reviewed  Pap with reflex done today  STD cultures done  Reviewed alternative nonhormonal birth control methods such as Paragard IUD, diaphragm, condoms in which effectiveness would not be decreased by Nuvigil  Patient would like to continue Mackenzie Conch as help control cycles and headaches  Return to office for annual or as needed  Other Visit Diagnoses     Encounter for surveillance of contraceptive pills        Relevant Medications    desogestrel-ethinyl estradiol (Justo Fatima) 0 15-0 02/0 01 MG (21/5) per tablet    Screening examination for venereal disease              Subjective:     Patient ID: Jacinda Londono is a 25 y o  y o  female  HPI  24 yo seen for annual exam  Currently on Kariva OCP tolerating well, controls cycles and helps with menstrual migraines  Uses condoms as well secondary to being on Nuvigil for Narcolepsy  Feels as though Venkata Cunningham is not working  Has appt with Neurology this afternoon  Hx of chlamydia in 2016, would like STD testing done today  Patient is graduating in May from college  The following portions of the patient's history were reviewed and updated as appropriate:   She  has a past medical history of Hearing impaired person, bilateral and Narcolepsy  She   Patient Active Problem List    Diagnosis Date Noted    Encounter for gynecological examination without abnormal finding 04/17/2018    Acute sinusitis 09/22/2017    Narcoleptic syndrome 08/15/2017    Daytime somnolence 04/06/2017    Esophageal spasm 04/06/2017    Chlamydial infection of lower genitourinary tract 12/22/2016    Deficiency anemia 08/22/2016    Dysmenorrhea 10/16/2015     She  has a past surgical history that includes Tympanostomy tube placement and Gerlach tooth extraction    Her family history includes Diabetes in her maternal grandmother; Diabetes type II in her mother and other; Hypertension in her father; Ovarian cancer in her other  She  reports that she has never smoked  She has never used smokeless tobacco  She reports that she does not drink alcohol or use drugs  Current Outpatient Prescriptions   Medication Sig Dispense Refill    Sodium Oxybate (XYREM) 500 MG/ML SOLN Take by mouth      Cholecalciferol (CVS VITAMIN D3) 1000 units capsule Take by mouth      desogestrel-ethinyl estradiol (KARIVA) 0 15-0 02/0 01 MG () per tablet Take 1 tablet by mouth daily 84 tablet 3    NUVIGIL 150 MG tablet TK 1 T PO QAM  0    pantoprazole (PROTONIX) 40 mg tablet Take 1 tablet (40 mg total) by mouth daily 30 tablet 3     No current facility-administered medications for this visit  She is allergic to albuterol       Menstrual History:  OB History      Para Term  AB Living    0 0 0 0 0 0    SAB TAB Ectopic Multiple Live Births    0 0 0 0 0         Menarche age: 16  No LMP recorded  Period Cycle (Days): 28  Period Duration (Days): 5-6  Period Pattern: Regular  Menstrual Flow: Light  Dysmenorrhea: None  Review of Systems   Constitutional: Negative for fatigue, fever and unexpected weight change  HENT: Negative for dental problem and sinus pressure  Eyes: Negative for visual disturbance  Respiratory: Negative for cough, shortness of breath and wheezing  Cardiovascular: Negative for chest pain  Gastrointestinal: Negative for abdominal pain, blood in stool, constipation, diarrhea, nausea and vomiting  Endocrine: Negative for polydipsia  Genitourinary: Negative for difficulty urinating, dyspareunia, dysuria, frequency, hematuria, pelvic pain and urgency  Musculoskeletal: Negative for arthralgias and back pain  Neurological: Negative for dizziness, seizures, light-headedness and headaches  Psychiatric/Behavioral: Negative for suicidal ideas  The patient is not nervous/anxious          Objective: Physical Exam   Constitutional: She is oriented to person, place, and time  She appears well-developed and well-nourished  Genitourinary: Vagina normal and uterus normal  There is no rash, tenderness, lesion, injury or Bartholin's cyst on the right labia  There is no rash, tenderness, lesion, injury or Bartholin's cyst on the left labia  Vagina exhibits no lesion  No erythema, tenderness or bleeding in the vagina  No signs of injury around the vagina  No vaginal discharge found  Right adnexum does not display mass, does not display tenderness and does not display fullness  Left adnexum does not display mass, does not display tenderness and does not display fullness  Cervix does not exhibit motion tenderness, lesion or discharge  Uterus is not enlarged, tender, exhibiting a mass, irregular (is regular) or mobile  HENT:   Head: Normocephalic and atraumatic  Neck: No thyromegaly present  Cardiovascular: Normal rate, regular rhythm and normal heart sounds  Exam reveals no gallop and no friction rub  No murmur heard  Pulmonary/Chest: Effort normal and breath sounds normal  No respiratory distress  She has no wheezes  Right breast exhibits no inverted nipple, no mass, no nipple discharge, no skin change and no tenderness  Left breast exhibits no inverted nipple, no mass, no nipple discharge, no skin change and no tenderness  Breasts are symmetrical  There is no breast swelling  Abdominal: Soft  She exhibits no distension and no mass  There is no tenderness  There is no rebound and no guarding  No hernia  Lymphadenopathy:     She has no cervical adenopathy  Right: No inguinal adenopathy present  Left: No inguinal adenopathy present  Neurological: She is alert and oriented to person, place, and time  Skin: Skin is warm and dry  Psychiatric: She has a normal mood and affect   Her behavior is normal

## 2018-04-17 NOTE — ASSESSMENT & PLAN NOTE
Pap guidelines reviewed  Pap with reflex done today  STD cultures done  Reviewed alternative nonhormonal birth control methods such as Paragard IUD, diaphragm, condoms in which effectiveness would not be decreased by Nuvigil  Patient would like to continue Theresa Nicholsonl as help control cycles and headaches  Return to office for annual or as needed

## 2018-04-19 LAB
DEPRECATED C TRACH RRNA XXX QL PRB: NOT DETECTED
N GONORRHOEA DNA UR QL NAA+PROBE: NOT DETECTED
THIN PREP CVX: NORMAL

## 2018-05-10 DIAGNOSIS — G47.411 NARCOLEPSY AND CATAPLEXY: Primary | ICD-10-CM

## 2018-05-10 RX ORDER — ARMODAFINIL 150 MG/1
150 TABLET ORAL DAILY
Qty: 30 TABLET | Refills: 5 | Status: SHIPPED | OUTPATIENT
Start: 2018-05-10 | End: 2018-06-14 | Stop reason: CLARIF

## 2018-05-26 ENCOUNTER — OFFICE VISIT (OUTPATIENT)
Dept: URGENT CARE | Age: 23
End: 2018-05-26
Payer: COMMERCIAL

## 2018-05-26 VITALS
SYSTOLIC BLOOD PRESSURE: 120 MMHG | OXYGEN SATURATION: 97 % | TEMPERATURE: 98.8 F | HEART RATE: 96 BPM | BODY MASS INDEX: 22.88 KG/M2 | HEIGHT: 68 IN | WEIGHT: 151 LBS | RESPIRATION RATE: 20 BRPM | DIASTOLIC BLOOD PRESSURE: 76 MMHG

## 2018-05-26 DIAGNOSIS — J01.90 ACUTE NON-RECURRENT SINUSITIS, UNSPECIFIED LOCATION: Primary | ICD-10-CM

## 2018-05-26 DIAGNOSIS — J06.9 ACUTE UPPER RESPIRATORY INFECTION: ICD-10-CM

## 2018-05-26 PROCEDURE — 99213 OFFICE O/P EST LOW 20 MIN: CPT | Performed by: FAMILY MEDICINE

## 2018-05-26 RX ORDER — CEFUROXIME AXETIL 500 MG/1
500 TABLET ORAL EVERY 12 HOURS SCHEDULED
Qty: 20 TABLET | Refills: 0 | Status: SHIPPED | OUTPATIENT
Start: 2018-05-26 | End: 2018-06-05

## 2018-05-26 NOTE — PATIENT INSTRUCTIONS
Rest, limit activity  Ceftin (cefuroxime) twice a day until finished (please take probiotics)  Cold/sinus medication as needed (try Flonase nasal spray 1 spray in each nostril once or twice a day  Tylenol, or ibuprofen (Advil/Motrin) as needed  Recheck/follow-up with family physician as needed  Please go to the hospital emergency department if needed

## 2018-05-26 NOTE — PROGRESS NOTES
[facility]        NAME: Dominic Harvey is a 26 y o  female  : 1995    MRN: [id number]  DATE: May 26, 2018  TIME: 8:49 AM    Assessment and Plan   Acute non-recurrent sinusitis, unspecified location [J01 90]  1  Acute non-recurrent sinusitis, unspecified location  cefuroxime (CEFTIN) 500 mg tablet   2  Acute upper respiratory infection           Patient Instructions     Patient Instructions   Rest, limit activity  Ceftin (cefuroxime) twice a day until finished (please take probiotics)  Cold/sinus medication as needed (try Flonase nasal spray 1 spray in each nostril once or twice a day  Tylenol, or ibuprofen (Advil/Motrin) as needed  Recheck/follow-up with family physician as needed  Please go to the hospital emergency department if needed  Follow up with PCP in 3-5 days  Proceed to  ER if symptoms worsen  Chief Complaint     Chief Complaint   Patient presents with    Cold Like Symptoms     Started last night with nasal congestion and sinus pressure and body aches    Generalized Body Aches         History of Present Illness       Congestion, sinus pressure        Review of Systems   Review of Systems   Constitutional: Positive for fatigue  HENT: Positive for congestion and sinus pressure  Respiratory: Negative  Cardiovascular: Negative  Musculoskeletal: Positive for myalgias  Skin: Negative  Neurological: Negative            Current Medications       Current Outpatient Prescriptions:     Armodafinil (NUVIGIL) 150 MG tablet, Take 1 tablet (150 mg total) by mouth daily, Disp: 30 tablet, Rfl: 5    cefuroxime (CEFTIN) 500 mg tablet, Take 1 tablet (500 mg total) by mouth every 12 (twelve) hours for 20 doses, Disp: 20 tablet, Rfl: 0    Cholecalciferol (CVS VITAMIN D3) 1000 units capsule, Take by mouth, Disp: , Rfl:     desogestrel-ethinyl estradiol (KARIVA) 0 15-0 02/0 01 MG () per tablet, Take 1 tablet by mouth daily, Disp: 84 tablet, Rfl: 3    pantoprazole (PROTONIX) 40 mg tablet, Take 1 tablet (40 mg total) by mouth daily, Disp: 30 tablet, Rfl: 3    Sodium Oxybate (XYREM) 500 MG/ML SOLN, Take by mouth, Disp: , Rfl:     Current Allergies     Allergies as of 05/26/2018 - Reviewed 05/26/2018   Allergen Reaction Noted    Albuterol Other (See Comments) and Rash 03/16/2015            The following portions of the patient's history were reviewed and updated as appropriate: allergies, current medications, past family history, past medical history, past social history, past surgical history and problem list      Past Medical History:   Diagnosis Date    Hearing impaired person, bilateral     Narcolepsy        Past Surgical History:   Procedure Laterality Date    TYMPANOSTOMY TUBE PLACEMENT      last assessed: 04/06/2017; remove chornic fluid    WISDOM TOOTH EXTRACTION         Family History   Problem Relation Age of Onset    Diabetes type II Mother      without complications    Hypertension Father      benign essential     Diabetes Maternal Grandmother     Ovarian cancer Other     Diabetes type II Other      without complications         Medications have been verified  Objective   /76 (BP Location: Left arm, Patient Position: Sitting, Cuff Size: Standard)   Pulse 96   Temp 98 8 °F (37 1 °C) (Temporal)   Resp 20   Ht 5' 8" (1 727 m)   Wt 68 5 kg (151 lb)   LMP 05/12/2018   SpO2 97%   BMI 22 96 kg/m²        Physical Exam     Physical Exam   Constitutional: She is oriented to person, place, and time  She appears well-developed and well-nourished  No distress  HENT:   Right Ear: External ear normal    Left Ear: External ear normal    Nasal congestion; tenderness over the sinuses; injection of the oropharynx   Neck: Normal range of motion  Neck supple  Cardiovascular: Normal rate, regular rhythm and normal heart sounds      Pulmonary/Chest: Effort normal and breath sounds normal    Neurological: She is alert and oriented to person, place, and time    No nuchal rigidity   Skin: Skin is warm  Good color and turgor   Psychiatric: She has a normal mood and affect  Her behavior is normal    Nursing note and vitals reviewed

## 2018-06-05 DIAGNOSIS — G47.419 PRIMARY NARCOLEPSY WITHOUT CATAPLEXY: Primary | ICD-10-CM

## 2018-06-14 DIAGNOSIS — G47.419 PRIMARY NARCOLEPSY WITHOUT CATAPLEXY: Primary | ICD-10-CM

## 2018-06-14 RX ORDER — ARMODAFINIL 150 MG/1
TABLET ORAL
Qty: 30 TABLET | Refills: 5 | Status: SHIPPED | OUTPATIENT
Start: 2018-06-14 | End: 2018-12-13 | Stop reason: SDUPTHER

## 2018-08-01 ENCOUNTER — TELEPHONE (OUTPATIENT)
Dept: SLEEP CENTER | Facility: CLINIC | Age: 23
End: 2018-08-01

## 2018-08-01 NOTE — TELEPHONE ENCOUNTER
Prior authorization request submitted to [employer] via [employer]  Approved to 8/1/19    Case ID  [id number]

## 2018-09-05 ENCOUNTER — OFFICE VISIT (OUTPATIENT)
Dept: URGENT CARE | Age: 23
End: 2018-09-05
Payer: COMMERCIAL

## 2018-09-05 ENCOUNTER — OFFICE VISIT (OUTPATIENT)
Dept: FAMILY MEDICINE CLINIC | Facility: CLINIC | Age: 23
End: 2018-09-05
Payer: COMMERCIAL

## 2018-09-05 VITALS
WEIGHT: 159 LBS | OXYGEN SATURATION: 98 % | DIASTOLIC BLOOD PRESSURE: 103 MMHG | TEMPERATURE: 98.1 F | HEIGHT: 68 IN | RESPIRATION RATE: 14 BRPM | HEART RATE: 85 BPM | BODY MASS INDEX: 24.1 KG/M2 | SYSTOLIC BLOOD PRESSURE: 150 MMHG

## 2018-09-05 VITALS
RESPIRATION RATE: 14 BRPM | HEIGHT: 68 IN | TEMPERATURE: 98.1 F | WEIGHT: 160.4 LBS | SYSTOLIC BLOOD PRESSURE: 132 MMHG | HEART RATE: 72 BPM | BODY MASS INDEX: 24.31 KG/M2 | DIASTOLIC BLOOD PRESSURE: 78 MMHG

## 2018-09-05 DIAGNOSIS — K21.9 GASTROESOPHAGEAL REFLUX DISEASE WITHOUT ESOPHAGITIS: ICD-10-CM

## 2018-09-05 DIAGNOSIS — H01.00B BLEPHARITIS OF UPPER AND LOWER EYELIDS OF BOTH EYES, UNSPECIFIED TYPE: Primary | ICD-10-CM

## 2018-09-05 DIAGNOSIS — R03.0 ELEVATED BLOOD PRESSURE READING: ICD-10-CM

## 2018-09-05 DIAGNOSIS — H01.00A BLEPHARITIS OF UPPER AND LOWER EYELIDS OF BOTH EYES, UNSPECIFIED TYPE: Primary | ICD-10-CM

## 2018-09-05 DIAGNOSIS — L30.9 PERIORBITAL DERMATITIS: Primary | ICD-10-CM

## 2018-09-05 PROCEDURE — 3008F BODY MASS INDEX DOCD: CPT | Performed by: FAMILY MEDICINE

## 2018-09-05 PROCEDURE — 99213 OFFICE O/P EST LOW 20 MIN: CPT | Performed by: FAMILY MEDICINE

## 2018-09-05 RX ORDER — PANTOPRAZOLE SODIUM 40 MG/1
40 TABLET, DELAYED RELEASE ORAL DAILY
Qty: 30 TABLET | Refills: 0 | Status: SHIPPED | OUTPATIENT
Start: 2018-09-05 | End: 2018-10-10 | Stop reason: SDUPTHER

## 2018-09-05 RX ORDER — CEPHALEXIN 500 MG/1
500 CAPSULE ORAL EVERY 8 HOURS SCHEDULED
Qty: 21 CAPSULE | Refills: 0 | Status: SHIPPED | OUTPATIENT
Start: 2018-09-05 | End: 2018-09-12

## 2018-09-05 RX ORDER — PREDNISONE 10 MG/1
TABLET ORAL
Qty: 26 TABLET | Refills: 0 | Status: SHIPPED | OUTPATIENT
Start: 2018-09-05 | End: 2018-10-23 | Stop reason: ALTCHOICE
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
## 2018-09-05 NOTE — LETTER
September 5, 2018     Patient: Primus Bobbi   YOB: 1995   Date of Visit: 9/5/2018       To Whom it May Concern:2    Kelly Bravo is under my professional care  She was seen in my office on 9/5/2018  She may return to work on 9/6/18  If you have any questions or concerns, please don't hesitate to call           Sincerely,          Nicole Yang DO        CC: No Recipients

## 2018-09-05 NOTE — LETTER
September 5, 2018     Patient: Ang Bee   YOB: 1995   Date of Visit: 9/5/2018       To Whom It May Concern:    Patient seen in office today for acute medical ailment  May return to work tomorrow         Sincerely,        Karis Colunga PA-C    CC: No Recipients

## 2018-09-05 NOTE — PATIENT INSTRUCTIONS
Over the counter cortizone cream - apply as needed  Cool compresses  Avoid soaps, make up for next 24 hours  Monitor blood pressures  If remaining over 140/90, follow up with PCP

## 2018-09-05 NOTE — PROGRESS NOTES
Patient ID: Gely Da Silva is a 26 y o  female  HPI: 26 y  o female presents with swelling and redness of upper and lower eyelids bilaterally  Her skin around eyes is red, itchy as well  She cannot think of anything that obviously set her off        SUBJECTIVE    Family History   Problem Relation Age of Onset    Diabetes type II Mother         without complications    Hypertension Father         benign essential     Diabetes Maternal Grandmother     Ovarian cancer Other     Diabetes type II Other         without complications     Social History     Social History    Marital status: Single     Spouse name: N/A    Number of children: N/A    Years of education: N/A     Occupational History    Student      Social History Main Topics    Smoking status: Never Smoker    Smokeless tobacco: Never Used    Alcohol use No    Drug use: No    Sexual activity: Yes     Partners: Male     Birth control/ protection: OCP, Condom Male     Other Topics Concern    Not on file     Social History Narrative    Feels safe at home         Past Medical History:   Diagnosis Date    Hearing impaired person, bilateral     Narcolepsy      Past Surgical History:   Procedure Laterality Date    TYMPANOSTOMY TUBE PLACEMENT      last assessed: 04/06/2017; remove chornic fluid    WISDOM TOOTH EXTRACTION       Allergies   Allergen Reactions    Albuterol Other (See Comments) and Rash     unknown       Current Outpatient Prescriptions:     Armodafinil (NUVIGIL) 150 MG tablet, Dispense Nuvigil-Brand Necessary, Disp: 30 tablet, Rfl: 5    Cholecalciferol ([employer]S VITAMIN D3) 1000 units capsule, Take by mouth, Disp: , Rfl:     desogestrel-ethinyl estradiol (KARIVA) 0 15-0 02/0 01 MG (21/5) per tablet, Take 1 tablet by mouth daily, Disp: 84 tablet, Rfl: 3    pantoprazole (PROTONIX) 40 mg tablet, Take 1 tablet (40 mg total) by mouth daily, Disp: 30 tablet, Rfl: 0    Sodium Oxybate (XYREM) 500 MG/ML SOLN, Take 3 grams at bedtime and 3 grams 2 1/2 to 4 hours later=6 grams total nightly dose(Total quantity: 6 months), Disp: 180 g, Rfl: 5    cephalexin (KEFLEX) 500 mg capsule, Take 1 capsule (500 mg total) by mouth every 8 (eight) hours for 7 days, Disp: 21 capsule, Rfl: 0    predniSONE 10 mg tablet, 3 tabs po bid x2 days, then 2 tabs po bid x2 days, then 1 tab bid x2 days, then 1 daily until done , Disp: 26 tablet, Rfl: 0    Review of Systems  Constitutional:     Denies fever, chills ,fatigue ,weakness ,weight loss, weight gain     ENT: Denies earache ,loss of hearing ,nosebleed, nasal discharge,nasal congestion ,sore throat ,hoarseness + red, itchy, swollen upper and lower eyelids bilaterally  Pulmonary: Denies shortness of breath ,cough  ,dyspnea on exertion, orthopnea  ,PND   Cardiovascular:  Denies bradycardia , tachycardia  ,palpations, lower extremity edema leg, claudication  Breast:  Denies new or changing breast lumps ,nipple discharge ,nipple changes  Abdomen:  Denies abdominal pain , anorexia , indigestion, nausea, vomiting, constipation, diarrhea  Musculoskeletal: Denies myalgias, arthralgias, joint swelling, joint stiffness , limb pain, limb swelling  Gu: denies dysuria, polyuria  Skin: Denies skin rash, skin lesion, skin wound, itching, dry skin  Neuro: Denies headache, numbness, tingling, confusion, loss of consciousness, dizziness, vertigo  Psychiatric: Denies feelings of depression, suicidal ideation, anxiety, sleep disturbances    OBJECTIVE  /78   Pulse 72   Temp 98 1 °F (36 7 °C)   Resp 14   Ht 5' 8" (1 727 m)   Wt 72 8 kg (160 lb 6 4 oz)   LMP 08/15/2018   BMI 24 39 kg/m²   Constitutional:   NAD, well appearing and well nourished      ENT:   Conjunctiva and lids: + injection, +erytheam and edemaof upper and lower eyelids without discharge     Pupils and iris: AUGUSTINA bilaterally    External inspection of ears and nose: normal without deformities or discharge  Otoscopic exam: Canals patent without erythema  Nasal mucosa, septum and turbinates: Normal or edema or discharge         Oropharynx:  Moist mucosa, normal tongue and tonsils without lesions  No erythema        Pulmonary:Respiratory effort normal rate and rhythm, no increased work of breathing  Auscultation of lungs:  Clear bilaterally with no adventitious breath sounds       Cardiovascular: regular rate and rhythm, S1 and S2, no murmur, no edema and/or varicosities of LE      Abdomen: Soft and non-distended     Positive bowel sounds      No heptomegaly or splenomegaly    Gu: no suprapubic tenderness or CVA tenderness, no urethral discharge  Lymphatic:  No anterior or posterior cervical lymphadenopathy         Musculoskeletal:  Gait and station: Normal gait      Digits and nails normal without clubbing or cyanosis       Inspection/palpation of joints, bones, and muscles:  No joint tenderness, swelling, full active and passive range of motion       Skin: Normal skin turgor and no rashes      Neuro:    Normal reflexes       Psych:   alert and oriented to person, place and time     normal mood and affect       Assessment/Plan:Diagnoses and all orders for this visit:    Blepharitis of upper and lower eyelids of both eyes, unspecified type  -     predniSONE 10 mg tablet; 3 tabs po bid x2 days, then 2 tabs po bid x2 days, then 1 tab bid x2 days, then 1 daily until done  -     cephalexin (KEFLEX) 500 mg capsule; Take 1 capsule (500 mg total) by mouth every 8 (eight) hours for 7 days    Gastroesophageal reflux disease without esophagitis  -     pantoprazole (PROTONIX) 40 mg tablet; Take 1 tablet (40 mg total) by mouth daily        Reviewed with patient plan to treat with the above      Patient instructed to call in 72 hours if not feeling better or if symptoms worsen

## 2018-10-10 DIAGNOSIS — K21.9 GASTROESOPHAGEAL REFLUX DISEASE WITHOUT ESOPHAGITIS: ICD-10-CM

## 2018-10-11 RX ORDER — PANTOPRAZOLE SODIUM 40 MG/1
TABLET, DELAYED RELEASE ORAL
Qty: 30 TABLET | Refills: 0 | Status: SHIPPED | OUTPATIENT
Start: 2018-10-11 | End: 2018-11-10 | Stop reason: SDUPTHER

## 2018-10-18 ENCOUNTER — OFFICE VISIT (OUTPATIENT)
Dept: SLEEP CENTER | Facility: CLINIC | Age: 23
End: 2018-10-18
Payer: COMMERCIAL

## 2018-10-18 VITALS
DIASTOLIC BLOOD PRESSURE: 76 MMHG | SYSTOLIC BLOOD PRESSURE: 116 MMHG | WEIGHT: 165 LBS | HEIGHT: 68 IN | HEART RATE: 76 BPM | BODY MASS INDEX: 25.01 KG/M2

## 2018-10-18 DIAGNOSIS — G47.411 NARCOLEPSY AND CATAPLEXY: Primary | ICD-10-CM

## 2018-10-18 PROCEDURE — 99214 OFFICE O/P EST MOD 30 MIN: CPT | Performed by: INTERNAL MEDICINE

## 2018-10-18 RX ORDER — DEXTROAMPHETAMINE SACCHARATE, AMPHETAMINE ASPARTATE, DEXTROAMPHETAMINE SULFATE AND AMPHETAMINE SULFATE 1.25; 1.25; 1.25; 1.25 MG/1; MG/1; MG/1; MG/1
5 TABLET ORAL DAILY
Qty: 30 TABLET | Refills: 0 | Status: SHIPPED | OUTPATIENT
Start: 2018-10-18 | End: 2018-11-27 | Stop reason: SDUPTHER

## 2018-10-18 NOTE — PROGRESS NOTES
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    Assessment:  The patient is doing well on her current regimen of Xyrem 3 g twice nightly and Nuvigil 150 mg every morning  Her only complaint is feeling drowsy in the evening when she has class  I suggested that she try Adderall 5 mg at 3:00 p m     A second dose of Nuvigil would cause insomnia  I would consider switching to Provigil twice a day if her insurance would allow  Plan: Adderall 5 mg at 3:00 p m  Follow up: Two months    History of Present Illness: The patient has a history of narcolepsy with cataplexy  She is doing well on her current regimen of Xyrem and Nuvigil        Historical Information    Past Medical History:   Past Medical History:   Diagnosis Date    Hearing impaired person, bilateral     Narcolepsy          Past Surgical History:   Past Surgical History:   Procedure Laterality Date    TYMPANOSTOMY TUBE PLACEMENT      last assessed: 04/06/2017; remove chornic fluid    WISDOM TOOTH EXTRACTION           Social History - see chart  History   Alcohol use: Not on file     History   Drug Use Not on file     History   Smoking Status    Not on file   Smokeless Tobacco    Not on file     Family History:   Family History   Problem Relation Age of Onset    Diabetes type II Mother         without complications    Hypertension Father         benign essential     Diabetes Maternal Grandmother     Ovarian cancer Other     Diabetes type II Other         without complications       Medications/Allergies:      Current Outpatient Prescriptions:     Armodafinil (NUVIGIL) 150 MG tablet, Dispense Nuvigil-Brand Necessary, Disp: 30 tablet, Rfl: 5    Cholecalciferol (CVS VITAMIN D3) 1000 units capsule, Take by mouth, Disp: , Rfl:     desogestrel-ethinyl estradiol (KARIVA) 0 15-0 02/0 01 MG (21/5) per tablet, Take 1 tablet by mouth daily, Disp: 84 tablet, Rfl: 3    pantoprazole (PROTONIX) 40 mg tablet, TAKE 1 TABLET(40 MG) BY MOUTH DAILY, Disp: 30 tablet, Rfl: 0    Sodium Oxybate (XYREM) 500 MG/ML SOLN, Take 3 grams at bedtime and 3 grams 2 1/2 to 4 hours later=6 grams total nightly dose(Total quantity: 6 months), Disp: 180 g, Rfl: 5    amphetamine-dextroamphetamine (ADDERALL) 5 MG tablet, Take 1 tablet (5 mg total) by mouth daily Max Daily Amount: 5 mg, Disp: 30 tablet, Rfl: 0    predniSONE 10 mg tablet, 3 tabs po bid x2 days, then 2 tabs po bid x2 days, then 1 tab bid x2 days, then 1 daily until done  (Patient not taking: Reported on 10/18/2018 ), Disp: 26 tablet, Rfl: 0      Objective    Vital Signs:   Vitals:    10/18/18 1600   BP: 116/76   Pulse: 76   Weight: 74 8 kg (165 lb)   Height: 5' 8" (1 727 m)     [place] Sleepiness Scale: Total score: 8    Physical Exam:    General: Alert, appropriate, cooperative, overweight    Head: NC/AT    Skin: Warm, dry    Neuro: No motor abnormalities, cranial nerves appear intact    Psych: Normal affect        EDGAR Rodriguez    Board Certified Sleep Specialist  Review of Systems      Genitourinary none   Cardiology none   Gastrointestinal frequent heartburn/acid reflux   Neurology forgetfulness and poor concentration or confusion,    Constitutional claustrophobia and excessive sweating at night   Integumentary none   Psychiatry anxiety   Musculoskeletal none   Pulmonary snoring   ENT none   Endocrine none   Hematological none

## 2018-10-23 ENCOUNTER — OFFICE VISIT (OUTPATIENT)
Dept: FAMILY MEDICINE CLINIC | Facility: CLINIC | Age: 23
End: 2018-10-23
Payer: COMMERCIAL

## 2018-10-23 VITALS
TEMPERATURE: 98.8 F | SYSTOLIC BLOOD PRESSURE: 118 MMHG | HEIGHT: 68 IN | DIASTOLIC BLOOD PRESSURE: 78 MMHG | WEIGHT: 165 LBS | HEART RATE: 74 BPM | BODY MASS INDEX: 25.01 KG/M2

## 2018-10-23 DIAGNOSIS — H65.192 OTHER ACUTE NONSUPPURATIVE OTITIS MEDIA OF LEFT EAR, RECURRENCE NOT SPECIFIED: Primary | ICD-10-CM

## 2018-10-23 DIAGNOSIS — G47.419 PRIMARY NARCOLEPSY WITHOUT CATAPLEXY: ICD-10-CM

## 2018-10-23 PROCEDURE — 99213 OFFICE O/P EST LOW 20 MIN: CPT | Performed by: NURSE PRACTITIONER

## 2018-10-23 PROCEDURE — 3008F BODY MASS INDEX DOCD: CPT | Performed by: NURSE PRACTITIONER

## 2018-10-23 PROCEDURE — 1036F TOBACCO NON-USER: CPT | Performed by: NURSE PRACTITIONER

## 2018-10-23 RX ORDER — AMOXICILLIN 875 MG/1
875 TABLET, COATED ORAL 2 TIMES DAILY
Qty: 20 TABLET | Refills: 0 | Status: SHIPPED | OUTPATIENT
Start: 2018-10-23 | End: 2018-11-02

## 2018-10-23 NOTE — PROGRESS NOTES
Assessment/Plan:     Diagnoses and all orders for this visit:    Other acute nonsuppurative otitis media of left ear, recurrence not specified  -     amoxicillin (AMOXIL) 875 mg tablet; Take 1 tablet (875 mg total) by mouth 2 (two) times a day for 10 days    Discussed with patient plan to trest with above antibiotic for 10 days  Patient instructed to call if not better in 72 hours or if symptoms worsen    Subjective:      Patient ID: Maximiliano Martinez is a 26 y o  female  26year old female presenting with left ear pain for past five days  She first thought that she had scratched her ear canal and that was causing the pain but when she could not put her hearing aid in without pain she became worried  She as not taken any thing to treat symptoms  She denies nasal congestion, fever, chills or fatigue being associate with the ear pain          Family History   Problem Relation Age of Onset    Diabetes type II Mother         without complications    Hypertension Father         benign essential     Diabetes Maternal Grandmother     Ovarian cancer Other     Diabetes type II Other         without complications     Social History     Social History    Marital status: Single     Spouse name: N/A    Number of children: N/A    Years of education: N/A     Occupational History    Student      Social History Main Topics    Smoking status: Never Smoker    Smokeless tobacco: Never Used    Alcohol use No    Drug use: No    Sexual activity: Yes     Partners: Male     Birth control/ protection: OCP, Condom Male     Other Topics Concern    Not on file     Social History Narrative    Feels safe at home         Past Medical History:   Diagnosis Date    Hearing impaired person, bilateral     Narcolepsy      Past Surgical History:   Procedure Laterality Date    TYMPANOSTOMY TUBE PLACEMENT      last assessed: 04/06/2017; remove chornic fluid    WISDOM TOOTH EXTRACTION       Allergies   Allergen Reactions    Albuterol Other (See Comments) and Rash     unknown       Current Outpatient Prescriptions:     amphetamine-dextroamphetamine (ADDERALL) 5 MG tablet, Take 1 tablet (5 mg total) by mouth daily Max Daily Amount: 5 mg, Disp: 30 tablet, Rfl: 0    Armodafinil (NUVIGIL) 150 MG tablet, Dispense Nuvigil-Brand Necessary, Disp: 30 tablet, Rfl: 5    Cholecalciferol (CVS VITAMIN D3) 1000 units capsule, Take by mouth, Disp: , Rfl:     desogestrel-ethinyl estradiol (KARIVA) 0 15-0 02/0 01 MG (21/5) per tablet, Take 1 tablet by mouth daily, Disp: 84 tablet, Rfl: 3    pantoprazole (PROTONIX) 40 mg tablet, TAKE 1 TABLET(40 MG) BY MOUTH DAILY, Disp: 30 tablet, Rfl: 0    Sodium Oxybate (XYREM) 500 MG/ML SOLN, Take 3 grams at bedtime and 3 grams 2 1/2 to 4 hours later=6 grams total nightly dose(Total quantity: 6 months), Disp: 180 g, Rfl: 0    amoxicillin (AMOXIL) 875 mg tablet, Take 1 tablet (875 mg total) by mouth 2 (two) times a day for 10 days, Disp: 20 tablet, Rfl: 0      Review of Systems   Constitutional: Negative  HENT: Positive for ear pain  Eyes: Negative  Respiratory: Negative  Cardiovascular: Negative  Gastrointestinal: Negative  Endocrine: Negative  Genitourinary: Negative  Musculoskeletal: Negative  Skin: Negative  Allergic/Immunologic: Negative  Neurological: Negative  Hematological: Negative  Psychiatric/Behavioral: Negative  Objective:    /78   Pulse 74   Temp 98 8 °F (37 1 °C)   Ht 5' 8" (1 727 m)   Wt 74 8 kg (165 lb)   BMI 25 09 kg/m² (Reviewed)     Physical Exam   Constitutional: She is oriented to person, place, and time  HENT:   Head: Normocephalic and atraumatic  Right Ear: Tympanic membrane, external ear and ear canal normal    Left Ear: External ear and ear canal normal  Tympanic membrane is erythematous  Nose: Mucosal edema present     Mouth/Throat: Uvula is midline, oropharynx is clear and moist and mucous membranes are normal    Eyes: Pupils are equal, round, and reactive to light  Conjunctivae, EOM and lids are normal    Neck: Trachea normal    Cardiovascular: Normal rate, regular rhythm and normal heart sounds  Pulmonary/Chest: Effort normal and breath sounds normal    Neurological: She is alert and oriented to person, place, and time  Skin: Skin is warm and dry  Psychiatric: She has a normal mood and affect   Her behavior is normal

## 2018-11-10 DIAGNOSIS — K21.9 GASTROESOPHAGEAL REFLUX DISEASE WITHOUT ESOPHAGITIS: ICD-10-CM

## 2018-11-12 RX ORDER — PANTOPRAZOLE SODIUM 40 MG/1
TABLET, DELAYED RELEASE ORAL
Qty: 30 TABLET | Refills: 0 | Status: SHIPPED | OUTPATIENT
Start: 2018-11-12 | End: 2019-01-30 | Stop reason: SDUPTHER

## 2018-11-27 DIAGNOSIS — G47.411 NARCOLEPSY AND CATAPLEXY: ICD-10-CM

## 2018-11-28 RX ORDER — DEXTROAMPHETAMINE SACCHARATE, AMPHETAMINE ASPARTATE, DEXTROAMPHETAMINE SULFATE AND AMPHETAMINE SULFATE 1.25; 1.25; 1.25; 1.25 MG/1; MG/1; MG/1; MG/1
5 TABLET ORAL DAILY
Qty: 30 TABLET | Refills: 0 | Status: SHIPPED | OUTPATIENT
Start: 2018-11-28 | End: 2018-12-27 | Stop reason: SDUPTHER

## 2018-12-04 DIAGNOSIS — G47.419 PRIMARY NARCOLEPSY WITHOUT CATAPLEXY: ICD-10-CM

## 2018-12-05 DIAGNOSIS — G47.419 PRIMARY NARCOLEPSY WITHOUT CATAPLEXY: ICD-10-CM

## 2018-12-06 DIAGNOSIS — G47.419 PRIMARY NARCOLEPSY WITHOUT CATAPLEXY: Primary | ICD-10-CM

## 2018-12-13 DIAGNOSIS — G47.419 PRIMARY NARCOLEPSY WITHOUT CATAPLEXY: ICD-10-CM

## 2018-12-13 RX ORDER — ARMODAFINIL 150 MG/1
TABLET ORAL
Qty: 30 TABLET | Refills: 5 | Status: SHIPPED | OUTPATIENT
Start: 2018-12-13 | End: 2019-01-16 | Stop reason: CLARIF

## 2018-12-27 DIAGNOSIS — G47.411 NARCOLEPSY AND CATAPLEXY: ICD-10-CM

## 2018-12-28 RX ORDER — DEXTROAMPHETAMINE SACCHARATE, AMPHETAMINE ASPARTATE, DEXTROAMPHETAMINE SULFATE AND AMPHETAMINE SULFATE 1.25; 1.25; 1.25; 1.25 MG/1; MG/1; MG/1; MG/1
5 TABLET ORAL DAILY
Qty: 30 TABLET | Refills: 0 | Status: SHIPPED | OUTPATIENT
Start: 2018-12-28 | End: 2020-10-27 | Stop reason: ALTCHOICE

## 2019-01-09 ENCOUNTER — OFFICE VISIT (OUTPATIENT)
Dept: SLEEP CENTER | Facility: CLINIC | Age: 24
End: 2019-01-09
Payer: COMMERCIAL

## 2019-01-09 VITALS
HEART RATE: 68 BPM | WEIGHT: 160 LBS | HEIGHT: 68 IN | SYSTOLIC BLOOD PRESSURE: 114 MMHG | BODY MASS INDEX: 24.25 KG/M2 | DIASTOLIC BLOOD PRESSURE: 74 MMHG

## 2019-01-09 DIAGNOSIS — G47.419 PRIMARY NARCOLEPSY WITHOUT CATAPLEXY: Primary | ICD-10-CM

## 2019-01-09 PROCEDURE — 99214 OFFICE O/P EST MOD 30 MIN: CPT | Performed by: INTERNAL MEDICINE

## 2019-01-09 NOTE — PROGRESS NOTES
Progress Note - Sleep Center   Amparo Magallanes BIQ:[birth year] MRN: [id number]      Reason for Visit:    26 y  o female with narcolepsy, without cataplexy    Assessment:  The patient seems to be doing well on her current regimen of Xyrem 3 g twice nightly and Nuvigil 150 mg by mouth every morning with Adderall 5 mg later in the day  No changes at this time  Plan:  Continue current regimen    Follow up:  Six months    History of Present Illness: The patient's drowsiness is much improved on her current regimen  She inquired about her second dose of Xyrem  She awakens two and 0 5 hr after her first dose and I said that it would be fine for her to take her second dose at that time  She naps for 1 to 2 hr when she gets back from work and feels refreshed  She is having some difficulty with memory  She is studying for her school exams and feels that her recall is not as good as it should be        Review of Systems      Genitourinary none   Cardiology none   Gastrointestinal none   Neurology forgetfulness and poor concentration or confusion,    Constitutional claustrophobia and excessive sweating at night   Integumentary rash or dry skin   Psychiatry anxiety   Musculoskeletal none   Pulmonary none   ENT none   Endocrine none   Hematological none         Historical Information    Past Medical History:   Past Medical History:   Diagnosis Date    Hearing impaired person, bilateral     Narcolepsy          Past Surgical History:   Past Surgical History:   Procedure Laterality Date    TYMPANOSTOMY TUBE PLACEMENT      last assessed: 04/06/2017; remove chornic fluid    WISDOM TOOTH EXTRACTION           Social History - see chart  History   Alcohol use: Not on file       History   Smoking Status    Not on file   Smokeless Tobacco    Not on file     Family History:   Family History   Problem Relation Age of Onset    Diabetes type II Mother         without complications    Hypertension Father         benign essential     Diabetes Maternal Grandmother     Ovarian cancer Other     Diabetes type II Other         without complications       Medications/Allergies:      Current Outpatient Prescriptions:     amphetamine-dextroamphetamine (ADDERALL) 5 MG tablet, Take 1 tablet (5 mg total) by mouth daily Max Daily Amount: 5 mg, Disp: 30 tablet, Rfl: 0    Armodafinil (NUVIGIL) 150 MG tablet, Dispense Nuvigil-Brand Necessary, Disp: 30 tablet, Rfl: 5    Cholecalciferol (CVS VITAMIN D3) 1000 units capsule, Take by mouth, Disp: , Rfl:     desogestrel-ethinyl estradiol (KARIVA) 0 15-0 02/0 01 MG (21/5) per tablet, Take 1 tablet by mouth daily, Disp: 84 tablet, Rfl: 3    pantoprazole (PROTONIX) 40 mg tablet, TAKE 1 TABLET(40 MG) BY MOUTH DAILY, Disp: 30 tablet, Rfl: 0    Sodium Oxybate (XYREM) 500 MG/ML SOLN, First dose (bedtime): 3 g + Second dose (2 5-4 hrs later): 3 g = 6 g Total Nightly Dose [Total Qty: 1 month(s)], Disp: 180 g, Rfl: 5      Objective    Vital Signs:   Vitals:    01/09/19 1500   BP: 114/74   Pulse: 68   Weight: 72 6 kg (160 lb)   Height: 5' 8" (1 727 m)     [place] Sleepiness Scale: Total score: 7    Physical Exam:    General: Alert, appropriate, cooperative, overweight    Head: NC/AT    Skin: Warm, dry    Neuro: No motor abnormalities, cranial nerves appear intact    Psych: Normal affect    I have reviewed and updated the review of systems as necessary        EDGAR Melchor    Board Certified Sleep Specialist

## 2019-01-14 NOTE — TELEPHONE ENCOUNTER
Called patient and notified her that Nuvigil 150 mg tablets needs prior authorization  Asked patient if she has tried any other medication  Patient reports no   Will start prior authorization

## 2019-01-16 ENCOUNTER — TELEPHONE (OUTPATIENT)
Dept: SLEEP CENTER | Facility: CLINIC | Age: 24
End: 2019-01-16

## 2019-01-16 DIAGNOSIS — G47.419 PRIMARY NARCOLEPSY WITHOUT CATAPLEXY: ICD-10-CM

## 2019-01-16 RX ORDER — ARMODAFINIL 150 MG/1
150 TABLET ORAL DAILY
Qty: 30 TABLET | Refills: 5 | Status: CANCELLED | OUTPATIENT
Start: 2019-01-16

## 2019-01-16 NOTE — TELEPHONE ENCOUNTER
Khang Smalls is not covered by insurance  Spoke with patient and she is agreeable to trying the generic Armodafinil  Discussed with Dr Gary Hummel and he will send Rx to [facility]

## 2019-01-17 ENCOUNTER — TELEPHONE (OUTPATIENT)
Dept: SLEEP CENTER | Facility: CLINIC | Age: 24
End: 2019-01-17

## 2019-01-17 DIAGNOSIS — G47.419 PRIMARY NARCOLEPSY WITHOUT CATAPLEXY: ICD-10-CM

## 2019-01-17 DIAGNOSIS — G47.419 PRIMARY NARCOLEPSY WITHOUT CATAPLEXY: Primary | ICD-10-CM

## 2019-01-17 RX ORDER — ARMODAFINIL 150 MG/1
150 TABLET ORAL DAILY
Qty: 30 TABLET | Refills: 5 | Status: CANCELLED | OUTPATIENT
Start: 2019-01-17

## 2019-01-17 RX ORDER — ARMODAFINIL 150 MG/1
TABLET ORAL
Qty: 30 TABLET | Refills: 2 | Status: SHIPPED | OUTPATIENT
Start: 2019-01-17 | End: 2019-04-22 | Stop reason: SDUPTHER

## 2019-01-17 NOTE — TELEPHONE ENCOUNTER
Per [facility], Rosa M Rides is denied  It is covered only if requested due to a formulation difference in the inactive ingredients (dyes, fillers, preservatives) between the brand and generic  The prescribing physician must document that the difference would result in a significant allergy or serious adverse reaction

## 2019-01-23 ENCOUNTER — OFFICE VISIT (OUTPATIENT)
Dept: FAMILY MEDICINE CLINIC | Facility: CLINIC | Age: 24
End: 2019-01-23
Payer: COMMERCIAL

## 2019-01-23 VITALS
HEIGHT: 68 IN | HEART RATE: 68 BPM | DIASTOLIC BLOOD PRESSURE: 74 MMHG | TEMPERATURE: 98.4 F | SYSTOLIC BLOOD PRESSURE: 118 MMHG | BODY MASS INDEX: 24.4 KG/M2 | WEIGHT: 161 LBS

## 2019-01-23 DIAGNOSIS — Z00.00 PHYSICAL EXAM: Primary | ICD-10-CM

## 2019-01-23 DIAGNOSIS — Z23 ENCOUNTER FOR IMMUNIZATION: ICD-10-CM

## 2019-01-23 PROCEDURE — 99395 PREV VISIT EST AGE 18-39: CPT | Performed by: NURSE PRACTITIONER

## 2019-01-23 PROCEDURE — 86580 TB INTRADERMAL TEST: CPT | Performed by: NURSE PRACTITIONER

## 2019-01-23 NOTE — PROGRESS NOTES
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[employer]  and the physical is required for her to participate as a student in Early Childhood Program          Family History   Problem Relation Age of Onset    Diabetes type II Mother         without complications    Hypertension Father         benign essential     Diabetes Maternal Grandmother     Ovarian cancer Other     Diabetes type II Other         without complications     Social History     Social History    Marital status: Single     Spouse name: N/A    Number of children: N/A    Years of education: N/A     Occupational History    Student      Social History Main Topics    Smoking status: Never Smoker    Smokeless tobacco: Never Used    Alcohol use No    Drug use: No    Sexual activity: Yes     Partners: Male     Birth control/ protection: OCP, Condom Male     Other Topics Concern    Not on file     Social History Narrative    Feels safe at home         Past Medical History:   Diagnosis Date    Hearing impaired person, bilateral     Narcolepsy      Past Surgical History:   Procedure Laterality Date    TYMPANOSTOMY TUBE PLACEMENT      last assessed: 04/06/2017; remove chornic fluid    WISDOM TOOTH EXTRACTION       Allergies   Allergen Reactions    Albuterol Other (See Comments) and Rash     unknown       Current Outpatient Prescriptions:     amphetamine-dextroamphetamine (ADDERALL) 5 MG tablet, Take 1 tablet (5 mg total) by mouth daily Max Daily Amount: 5 mg, Disp: 30 tablet, Rfl: 0    Armodafinil (NUVIGIL) 150 MG tablet, One by mouth every morning, Disp: 30 tablet, Rfl: 2    Cholecalciferol (CVS VITAMIN D3) 1000 units capsule, Take by mouth, Disp: , Rfl:     desogestrel-ethinyl estradiol (KARIVA) 0 15-0 02/0 01 MG (21/5) per tablet, Take 1 tablet by mouth daily, Disp: 84 tablet, Rfl: 3    pantoprazole (PROTONIX) 40 mg tablet, TAKE 1 TABLET(40 MG) BY MOUTH DAILY, Disp: 30 tablet, Rfl: 0    Sodium Oxybate (XYREM) 500 MG/ML SOLN, First dose (bedtime): 3 g + Second dose (2 5-4 hrs later): 3 g = 6 g Total Nightly Dose [Total Qty: 1 month(s)], Disp: 180 g, Rfl: 5      Review of Systems   Constitutional: Negative  HENT: Negative  Bilateral hearing aids   Eyes: Negative  Respiratory: Negative  Cardiovascular: Negative  Gastrointestinal: Negative  Endocrine: Negative  Genitourinary: Negative  Musculoskeletal: Negative  Skin: Negative  Allergic/Immunologic: Negative  Neurological: Negative  Hematological: Negative  Psychiatric/Behavioral: Negative  Objective:    /74   Pulse 68   Temp 98 4 °F (36 9 °C)   Ht 5' 8" (1 727 m)   Wt 73 kg (161 lb)   BMI 24 48 kg/m² (Reviewed)     Physical Exam   Constitutional: She is oriented to person, place, and time  Vital signs are normal  She appears well-developed and well-nourished  HENT:   Head: Normocephalic and atraumatic  Right Ear: Tympanic membrane, external ear and ear canal normal    Left Ear: Tympanic membrane, external ear and ear canal normal    Nose: Nose normal    Mouth/Throat: Uvula is midline, oropharynx is clear and moist and mucous membranes are normal    Eyes: Pupils are equal, round, and reactive to light  Conjunctivae, EOM and lids are normal    Neck: Trachea normal, normal range of motion and full passive range of motion without pain  Neck supple  Carotid bruit is not present  No thyromegaly present  Cardiovascular: Normal rate, regular rhythm, S1 normal, S2 normal, normal heart sounds and intact distal pulses  Pulses:       Popliteal pulses are 2+ on the right side, and 2+ on the left side  Dorsalis pedis pulses are 2+ on the right side, and 2+ on the left side  Posterior tibial pulses are 2+ on the right side, and 2+ on the left side  Pulmonary/Chest: Effort normal and breath sounds normal    Abdominal: Soft  Bowel sounds are normal  There is no tenderness  Musculoskeletal: Normal range of motion  Neurological: She is alert and oriented to person, place, and time  She has normal strength and normal reflexes  No cranial nerve deficit or sensory deficit  She displays a negative Romberg sign  Skin: Skin is warm, dry and intact  Psychiatric: She has a normal mood and affect  Her speech is normal and behavior is normal  Judgment and thought content normal    Vitals reviewed

## 2019-01-25 ENCOUNTER — CLINICAL SUPPORT (OUTPATIENT)
Dept: FAMILY MEDICINE CLINIC | Facility: CLINIC | Age: 24
End: 2019-01-25

## 2019-01-25 DIAGNOSIS — Z11.1 ENCOUNTER FOR PPD SKIN TEST READING: Primary | ICD-10-CM

## 2019-01-25 LAB
INDURATION: 0 MM
TB SKIN TEST: NEGATIVE

## 2019-01-25 PROCEDURE — 3510F DOC TB SCRNG-RSLTS INTERPD: CPT | Performed by: FAMILY MEDICINE

## 2019-01-30 ENCOUNTER — TELEPHONE (OUTPATIENT)
Dept: FAMILY MEDICINE CLINIC | Facility: CLINIC | Age: 24
End: 2019-01-30

## 2019-01-30 DIAGNOSIS — K21.9 GASTROESOPHAGEAL REFLUX DISEASE WITHOUT ESOPHAGITIS: ICD-10-CM

## 2019-01-30 RX ORDER — PANTOPRAZOLE SODIUM 40 MG/1
40 TABLET, DELAYED RELEASE ORAL DAILY
Qty: 90 TABLET | Refills: 3 | Status: SHIPPED | OUTPATIENT
Start: 2019-01-30 | End: 2020-03-09

## 2019-01-30 NOTE — TELEPHONE ENCOUNTER
Wants a 90 day supply   Of Pantoprazole  40 mg sent to [facility] on [address],  She is completely out  She asked for this when she was here last  Will ck with pharm if no cb

## 2019-03-29 ENCOUNTER — OFFICE VISIT (OUTPATIENT)
Dept: FAMILY MEDICINE CLINIC | Facility: CLINIC | Age: 24
End: 2019-03-29
Payer: COMMERCIAL

## 2019-03-29 VITALS
BODY MASS INDEX: 24.25 KG/M2 | HEIGHT: 68 IN | HEART RATE: 72 BPM | TEMPERATURE: 98.5 F | WEIGHT: 160 LBS | SYSTOLIC BLOOD PRESSURE: 108 MMHG | DIASTOLIC BLOOD PRESSURE: 74 MMHG

## 2019-03-29 DIAGNOSIS — J11.1 INFLUENZA: Primary | ICD-10-CM

## 2019-03-29 PROCEDURE — 99213 OFFICE O/P EST LOW 20 MIN: CPT | Performed by: FAMILY MEDICINE

## 2019-03-29 PROCEDURE — 1036F TOBACCO NON-USER: CPT | Performed by: FAMILY MEDICINE

## 2019-03-29 PROCEDURE — 3008F BODY MASS INDEX DOCD: CPT | Performed by: FAMILY MEDICINE

## 2019-03-29 RX ORDER — DEXTROMETHORPHAN HYDROBROMIDE AND PROMETHAZINE HYDROCHLORIDE 15; 6.25 MG/5ML; MG/5ML
5 SYRUP ORAL 4 TIMES DAILY PRN
Qty: 180 ML | Refills: 0 | Status: SHIPPED | OUTPATIENT
Start: 2019-03-29 | End: 2019-05-30 | Stop reason: ALTCHOICE

## 2019-03-29 RX ORDER — OSELTAMIVIR PHOSPHATE 75 MG/1
75 CAPSULE ORAL 2 TIMES DAILY
Qty: 10 CAPSULE | Refills: 0 | Status: SHIPPED | OUTPATIENT
Start: 2019-03-29 | End: 2019-04-03

## 2019-03-29 NOTE — PROGRESS NOTES
Patient ID: Amparo Magallanes is a 26 y o  female  HPI: 26 y  o female presenting with 1 day history of sore throat, nasal congestion, ear pain,pnd and cough  Pt c/o fever, chills, and  body aches  She had some diarrhea this am as well       SUBJECTIVE    Family History   Problem Relation Age of Onset    Diabetes type II Mother         without complications    Hypertension Father         benign essential     Diabetes Maternal Grandmother     Ovarian cancer Other     Diabetes type II Other         without complications     Social History     Socioeconomic History    Marital status: Single     Spouse name: Not on file    Number of children: Not on file    Years of education: Not on file    Highest education level: Not on file   Occupational History    Occupation: Student   Social Needs    Financial resource strain: Not on file    Food insecurity:     Worry: Not on file     Inability: Not on file    Transportation needs:     Medical: Not on file     Non-medical: Not on file   Tobacco Use    Smoking status: Never Smoker    Smokeless tobacco: Never Used   Substance and Sexual Activity    Alcohol use: No    Drug use: No    Sexual activity: Yes     Partners: Male     Birth control/protection: OCP, Condom Male   Lifestyle    Physical activity:     Days per week: Not on file     Minutes per session: Not on file    Stress: Not on file   Relationships    Social connections:     Talks on phone: Not on file     Gets together: Not on file     Attends Confucianist service: Not on file     Active member of club or organization: Not on file     Attends meetings of clubs or organizations: Not on file     Relationship status: Not on file    Intimate partner violence:     Fear of current or ex partner: Not on file     Emotionally abused: Not on file     Physically abused: Not on file     Forced sexual activity: Not on file   Other Topics Concern    Not on file   Social History Narrative    Feels safe at home Past Medical History:   Diagnosis Date    Hearing impaired person, bilateral     Narcolepsy      Past Surgical History:   Procedure Laterality Date    TYMPANOSTOMY TUBE PLACEMENT      last assessed: 04/06/2017; remove chornic fluid    WISDOM TOOTH EXTRACTION       Allergies   Allergen Reactions    Albuterol Other (See Comments) and Rash     unknown       Current Outpatient Medications:     amphetamine-dextroamphetamine (ADDERALL) 5 MG tablet, Take 1 tablet (5 mg total) by mouth daily Max Daily Amount: 5 mg, Disp: 30 tablet, Rfl: 0    Armodafinil (NUVIGIL) 150 MG tablet, One by mouth every morning, Disp: 30 tablet, Rfl: 2    Cholecalciferol (CVS VITAMIN D3) 1000 units capsule, Take by mouth, Disp: , Rfl:     desogestrel-ethinyl estradiol (KARIVA) 0 15-0 02/0 01 MG (21/5) per tablet, Take 1 tablet by mouth daily, Disp: 84 tablet, Rfl: 3    pantoprazole (PROTONIX) 40 mg tablet, Take 1 tablet (40 mg total) by mouth daily, Disp: 90 tablet, Rfl: 3    Sodium Oxybate (XYREM) 500 MG/ML SOLN, First dose (bedtime): 3 g + Second dose (2 5-4 hrs later): 3 g = 6 g Total Nightly Dose [Total Qty: 1 month(s)], Disp: 180 g, Rfl: 5    oseltamivir (TAMIFLU) 75 mg capsule, Take 1 capsule (75 mg total) by mouth 2 (two) times a day for 5 days, Disp: 10 capsule, Rfl: 0    promethazine-dextromethorphan (PHENERGAN-DM) 6 25-15 mg/5 mL oral syrup, Take 5 mL by mouth 4 (four) times a day as needed for cough, Disp: 180 mL, Rfl: 0    Review of Systems  Constitutional:     + fever chills and body aches    ENT: Denies loss of hearing ,nosebleed, nasal discharge ,hoarseness; but admits to nasal congestion , sore throat and ear pain      Pulmonary: Denies shortness of breath ,dyspnea on exertion, orthopnea ; but admits to cough and pnd  Cardiovascular:  Denies bradycardia , tachycardia  ,palpations, lower extremity edema leg, claudication  Breast:  Denies new or changing breast lumps ,nipple discharge ,nipple changes  Abdomen:  Denies abdominal pain , anorexia , indigestion, nausea, vomiting, constipation, +diarrhea  Musculoskeletal: Denies myalgias, arthralgias, joint swelling, joint stiffness , limb pain, limb swelling  Lymph: + swollen glands  Gu: Denies polyuria or dysuria  Skin: Denies skin rash, skin lesion, skin wound, itching, dry skin  Neuro: Denies headache, numbness, tingling, confusion, loss of consciousness, dizziness, vertigo  Psychiatric: Denies feelings of depression, suicidal ideation, anxiety, sleep disturbances    OBJECTIVE  /74   Pulse 72   Temp 98 5 °F (36 9 °C)   Ht 5' 8" (1 727 m)   Wt 72 6 kg (160 lb)   BMI 24 33 kg/m²   Constitutional:   NAD, well appearing and well nourished     ENT:   Conjunctiva and lids: no injection, edema, or discharge    Pupils and iris: AUGUSTINA bilaterally  External inspection of ears and nose: normal without deformities or discharge  Otoscopic exam: Canals patent without erythema, tm dull and erythematous, effusions bilaterally   Nasal mucosa, septum and turbinates: + turbinate injection, nasal discharge         Oropharynx:  Moist mucosa, normal tongue and tonsils without lesions  + erythema and injection of posterior pharynx with pnd    Pulmonary:Respiratory effort normal rate and rhythm, no increased work of breathing   Auscultation of lungs:  Clear bilaterally with no adventitious breath sounds     Cardiovascular: regular rate and rhythm, S1 and S2, no murmur, no edema and/or varicosities of LE     Abdomen: Soft and nontender with + bowel sounds  No heptomegaly or splenomegaly     Gu: no suprapubic tenderness or CVA tenderness  Lymphatic: + anterior  cervical lymphadenopathy      Musculoskeletal:  Gait and station: Normal gait      Digits and nails normal without clubbing or cyanosis      Inspection/palpation of joints, bones, and muscles:  No joint tenderness, swelling, full active and passive range of motion       Skin: Normal skin turgor and no rashes      Neuro:    Normal reflexes  Pych:   alert and oriented to person, place and time     normal mood and affect      Assessment/Plan:Diagnoses and all orders for this visit:    Influenza  -     oseltamivir (TAMIFLU) 75 mg capsule; Take 1 capsule (75 mg total) by mouth 2 (two) times a day for 5 days  -     promethazine-dextromethorphan (PHENERGAN-DM) 6 25-15 mg/5 mL oral syrup; Take 5 mL by mouth 4 (four) times a day as needed for cough        Reviewed with patient plan to treat with above plan      Patient instructed to call in 72 hours if not feeling better or if symptoms worsen

## 2019-04-01 ENCOUNTER — TELEPHONE (OUTPATIENT)
Dept: FAMILY MEDICINE CLINIC | Facility: CLINIC | Age: 24
End: 2019-04-01

## 2019-04-04 DIAGNOSIS — Z30.41 ENCOUNTER FOR SURVEILLANCE OF CONTRACEPTIVE PILLS: ICD-10-CM

## 2019-04-04 RX ORDER — DESOG-E.ESTRADIOL/E.ESTRADIOL 21-5 (28)
TABLET ORAL
Qty: 84 TABLET | Refills: 0 | Status: SHIPPED | OUTPATIENT
Start: 2019-04-04 | End: 2019-06-21 | Stop reason: SDUPTHER

## 2019-04-22 DIAGNOSIS — G47.419 PRIMARY NARCOLEPSY WITHOUT CATAPLEXY: ICD-10-CM

## 2019-04-23 RX ORDER — ARMODAFINIL 150 MG/1
TABLET ORAL
Qty: 30 TABLET | Refills: 2 | Status: SHIPPED | OUTPATIENT
Start: 2019-04-23 | End: 2019-08-01 | Stop reason: SDUPTHER

## 2019-05-07 DIAGNOSIS — G47.419 PRIMARY NARCOLEPSY WITHOUT CATAPLEXY: ICD-10-CM

## 2019-05-30 ENCOUNTER — OFFICE VISIT (OUTPATIENT)
Dept: FAMILY MEDICINE CLINIC | Facility: CLINIC | Age: 24
End: 2019-05-30
Payer: COMMERCIAL

## 2019-05-30 VITALS
HEART RATE: 74 BPM | SYSTOLIC BLOOD PRESSURE: 112 MMHG | WEIGHT: 163 LBS | DIASTOLIC BLOOD PRESSURE: 74 MMHG | HEIGHT: 68 IN | TEMPERATURE: 97.8 F | BODY MASS INDEX: 24.71 KG/M2

## 2019-05-30 DIAGNOSIS — H00.015 HORDEOLUM EXTERNUM OF LEFT LOWER EYELID: Primary | ICD-10-CM

## 2019-05-30 PROCEDURE — 3008F BODY MASS INDEX DOCD: CPT | Performed by: FAMILY MEDICINE

## 2019-05-30 PROCEDURE — 1036F TOBACCO NON-USER: CPT | Performed by: FAMILY MEDICINE

## 2019-05-30 PROCEDURE — 99213 OFFICE O/P EST LOW 20 MIN: CPT | Performed by: FAMILY MEDICINE

## 2019-05-30 RX ORDER — CETIRIZINE HYDROCHLORIDE 10 MG/1
10 TABLET ORAL DAILY
COMMUNITY

## 2019-05-30 RX ORDER — METHYLPREDNISOLONE 4 MG/1
TABLET ORAL
Qty: 21 EACH | Refills: 0 | Status: SHIPPED | OUTPATIENT
Start: 2019-05-30 | End: 2019-07-16

## 2019-05-30 RX ORDER — TOBRAMYCIN 3 MG/ML
1 SOLUTION/ DROPS OPHTHALMIC
Qty: 5 ML | Refills: 0 | Status: SHIPPED | OUTPATIENT
Start: 2019-05-30 | End: 2019-07-16

## 2019-06-21 DIAGNOSIS — Z30.41 ENCOUNTER FOR SURVEILLANCE OF CONTRACEPTIVE PILLS: ICD-10-CM

## 2019-06-21 RX ORDER — DESOG-E.ESTRADIOL/E.ESTRADIOL 21-5 (28)
TABLET ORAL
Qty: 84 TABLET | Refills: 0 | Status: SHIPPED | OUTPATIENT
Start: 2019-06-21 | End: 2019-07-16 | Stop reason: SDUPTHER

## 2019-07-16 ENCOUNTER — ANNUAL EXAM (OUTPATIENT)
Dept: OBGYN CLINIC | Facility: CLINIC | Age: 24
End: 2019-07-16
Payer: COMMERCIAL

## 2019-07-16 VITALS
SYSTOLIC BLOOD PRESSURE: 126 MMHG | BODY MASS INDEX: 24.59 KG/M2 | DIASTOLIC BLOOD PRESSURE: 82 MMHG | HEIGHT: 69 IN | WEIGHT: 166 LBS

## 2019-07-16 DIAGNOSIS — Z01.419 GYNECOLOGIC EXAM NORMAL: Primary | ICD-10-CM

## 2019-07-16 DIAGNOSIS — Z30.41 ENCOUNTER FOR SURVEILLANCE OF CONTRACEPTIVE PILLS: ICD-10-CM

## 2019-07-16 DIAGNOSIS — L73.9 FOLLICULITIS OF LEFT AXILLA: ICD-10-CM

## 2019-07-16 PROCEDURE — 99395 PREV VISIT EST AGE 18-39: CPT | Performed by: PHYSICIAN ASSISTANT

## 2019-07-16 RX ORDER — DESOGESTREL AND ETHINYL ESTRADIOL 21-5 (28)
1 KIT ORAL DAILY
Qty: 84 TABLET | Refills: 3 | Status: SHIPPED | OUTPATIENT
Start: 2019-07-16 | End: 2020-08-11

## 2019-07-16 NOTE — PROGRESS NOTES
Assessment/Plan   Problem List Items Addressed This Visit        Musculoskeletal and Integument    Folliculitis of left axilla     Reviewed folliculitis with patient  Aware to call if fever, chills, or area tender, red, and becomes more swollen  Recommend warm compresses on area and should resolved on own  Other    Gynecologic exam normal - Primary     Pap guidelines reviewed  Pap with reflex and STD cultures done today  Will plan to continue Verlean Sharp with using condoms for back up secondary to [place] use  Return to office for annual or as needed  Relevant Orders    GP PAP/CT/GC (Reflex HPV PLUS when ASC-US)      Other Visit Diagnoses     Encounter for surveillance of contraceptive pills        Relevant Medications    desogestrel-ethinyl estradiol (Winfield Morris) 0 15-0 02/0 01 MG (21/5) per tablet          Subjective:     Patient ID: Luh Coombs is a 22 y o  y o  female  HPI  23 yo seen for annual exam  Patient currently on Kariva OCP, tolerating well would like to continue  Denies bowel or bladder issues  Patient uses condoms for back up secondary to being on Nuvigil for Narcolepsy  Patient reports 2 days ago noticed lump in left axilla, superficial, thought it was ingrown hair as had gotten them in axilla previously  Denies fever or chills  No breast lumps  Patient recently engaged  Patient has degree as , planning 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  Last pap: 4/17/2018 NILM  The following portions of the patient's history were reviewed and updated as appropriate:   She  has a past medical history of Hearing impaired person, bilateral and Narcolepsy    She   Patient Active Problem List    Diagnosis Date Noted    Gynecologic exam normal 10/96/0166    Folliculitis of left axilla 07/16/2019    Encounter for gynecological examination without abnormal finding 04/17/2018    Acute sinusitis 09/22/2017    Narcoleptic syndrome 08/15/2017    Daytime somnolence 2017    Esophageal spasm 2017    Chlamydial infection of lower genitourinary tract 2016    Deficiency anemia 2016    Dysmenorrhea 10/16/2015     She  has a past surgical history that includes Tympanostomy tube placement and [place] tooth extraction  Her family history includes Diabetes in her maternal grandmother; Diabetes type II in her mother and other; Heart attack in her mother; Hypertension in her father; Ovarian cancer in her other  She  reports that she has never smoked  She has never used smokeless tobacco  She reports that she does not drink alcohol or use drugs  Current Outpatient Medications   Medication Sig Dispense Refill    amphetamine-dextroamphetamine (ADDERALL) 5 MG tablet Take 1 tablet (5 mg total) by mouth daily Max Daily Amount: 5 mg 30 tablet 0    Armodafinil (NUVIGIL) 150 MG tablet One by mouth every morning 30 tablet 2    cetirizine (ZyrTEC) 10 mg tablet Take 10 mg by mouth daily      Cholecalciferol (CVS VITAMIN D3) 1000 units capsule Take by mouth      desogestrel-ethinyl estradiol (KARIVA) 0 15-0 02/0 01 MG () per tablet Take 1 tablet by mouth daily 84 tablet 3    pantoprazole (PROTONIX) 40 mg tablet Take 1 tablet (40 mg total) by mouth daily 90 tablet 3    Sodium Oxybate (XYREM) 500 MG/ML SOLN First dose (bedtime): 3 g + Second dose (2 5-4 hrs later): 3 g = 6 g Total Nightly Dose [Total Qty: 1 month(s)] 180 g 2     No current facility-administered medications for this visit  She is allergic to albuterol       Menstrual History:  OB History        0    Para   0    Term   0       0    AB   0    Living   0       SAB   0    TAB   0    Ectopic   0    Multiple   0    Live Births   0                Menarche age: 16  Patient's last menstrual period was 2019    Period Cycle (Days): 28  Period Duration (Days): 4-6  Period Pattern: Regular  Menstrual Flow: Light  Dysmenorrhea: None      Review of Systems Constitutional: Negative for fatigue, fever and unexpected weight change  HENT: Negative for dental problem and sinus pressure  Eyes: Negative for visual disturbance  Respiratory: Negative for cough, shortness of breath and wheezing  Cardiovascular: Negative for chest pain  Gastrointestinal: Negative for abdominal pain, blood in stool, constipation, diarrhea, nausea and vomiting  Endocrine: Negative for polydipsia  Genitourinary: Negative for difficulty urinating, dyspareunia, dysuria, frequency, hematuria, pelvic pain and urgency  Musculoskeletal: Negative for arthralgias and back pain  Neurological: Negative for dizziness, seizures, light-headedness and headaches  Psychiatric/Behavioral: Negative for suicidal ideas  The patient is not nervous/anxious  Objective:  Vitals:    07/16/19 1301   BP: 126/82   BP Location: Left arm   Patient Position: Sitting   Cuff Size: Standard   Weight: 75 3 kg (166 lb)   Height: 5' 9" (1 753 m)      Physical Exam   Constitutional: She is oriented to person, place, and time  She appears well-developed and well-nourished  Genitourinary: Vagina normal and uterus normal  There is no rash, tenderness, lesion, injury or Bartholin's cyst on the right labia  There is no rash, tenderness, lesion, injury or Bartholin's cyst on the left labia  Vagina exhibits no lesion  No erythema, tenderness or bleeding in the vagina  No signs of injury around the vagina  No vaginal discharge found  Right adnexum does not display mass, does not display tenderness and does not display fullness  Left adnexum does not display mass, does not display tenderness and does not display fullness  Cervix does not exhibit motion tenderness, lesion or discharge  Uterus is not enlarged, tender, exhibiting a mass, irregular (is regular) or mobile  HENT:   Head: Normocephalic and atraumatic  Neck: No thyromegaly present     Cardiovascular: Normal rate, regular rhythm and normal heart sounds  Exam reveals no gallop and no friction rub  No murmur heard  Pulmonary/Chest: Effort normal and breath sounds normal  No respiratory distress  She has no wheezes  Right breast exhibits no inverted nipple, no mass, no nipple discharge, no skin change and no tenderness  Left breast exhibits no inverted nipple, no mass, no nipple discharge, no skin change and no tenderness  No breast swelling  Breasts are symmetrical        Abdominal: Soft  She exhibits no distension and no mass  There is no tenderness  There is no rebound and no guarding  No hernia  Lymphadenopathy:     She has no cervical adenopathy  Right: No inguinal adenopathy present  Left: No inguinal adenopathy present  Neurological: She is alert and oriented to person, place, and time  Skin: Skin is warm and dry  Psychiatric: She has a normal mood and affect   Her behavior is normal

## 2019-07-16 NOTE — ASSESSMENT & PLAN NOTE
Pap guidelines reviewed  Pap with reflex and STD cultures done today  Will plan to continue Leopoldo Buggy with using condoms for back up secondary to [place] use  Return to office for annual or as needed

## 2019-07-16 NOTE — ASSESSMENT & PLAN NOTE
Reviewed folliculitis with patient  Aware to call if fever, chills, or area tender, red, and becomes more swollen  Recommend warm compresses on area and should resolved on own

## 2019-07-19 ENCOUNTER — TELEPHONE (OUTPATIENT)
Dept: SLEEP CENTER | Facility: CLINIC | Age: 24
End: 2019-07-19

## 2019-07-24 ENCOUNTER — TELEPHONE (OUTPATIENT)
Dept: FAMILY MEDICINE CLINIC | Facility: CLINIC | Age: 24
End: 2019-07-24

## 2019-07-24 DIAGNOSIS — G47.419 PRIMARY NARCOLEPSY WITHOUT CATAPLEXY: Primary | ICD-10-CM

## 2019-07-24 NOTE — TELEPHONE ENCOUNTER
[facility] called requesting a order be out in [facility] because she has [facility]       CPT G47 [id number]

## 2019-07-31 ENCOUNTER — OFFICE VISIT (OUTPATIENT)
Dept: SLEEP CENTER | Facility: CLINIC | Age: 24
End: 2019-07-31
Payer: COMMERCIAL

## 2019-07-31 VITALS
SYSTOLIC BLOOD PRESSURE: 136 MMHG | HEIGHT: 69 IN | DIASTOLIC BLOOD PRESSURE: 90 MMHG | WEIGHT: 167 LBS | BODY MASS INDEX: 24.73 KG/M2

## 2019-07-31 DIAGNOSIS — G47.419 PRIMARY NARCOLEPSY WITHOUT CATAPLEXY: Primary | ICD-10-CM

## 2019-07-31 PROCEDURE — 99213 OFFICE O/P EST LOW 20 MIN: CPT | Performed by: INTERNAL MEDICINE

## 2019-07-31 NOTE — PROGRESS NOTES
Progress Note - Sleep Center   Yessica Melgar CTS:[birth year] MRN: [id number]      Reason for Visit:    22 y  o female with narcolepsy, no cataplexy    Assessment:  The patient is doing well on her current regimen of Xyrem 3 g twice nightly, Nuvigil 150 mg every morning and Adderall 5 mg as needed  She has been complaining of sweats associated with Xyrem use  She denies any other problems  Plan:  Continue current regimen    Follow up:  Six months    History of Present Illness: The patient has a history of narcolepsy diagnosed on MSLT    She has been well controlled in terms of symptoms on her current regimen    Review of Systems      Genitourinary none   Cardiology none   Gastrointestinal none   Neurology forgetfulness and poor concentration or confusion,    Constitutional claustrophobia and excessive sweating at night   Integumentary none   Psychiatry anxiety   Musculoskeletal none   Pulmonary none   ENT none   Endocrine none   Hematological none         I have reviewed and updated the review of systems as necessary     Historical Information    Past Medical History:   Diagnosis Date    Hearing impaired person, bilateral     Narcolepsy          Past Surgical History:   Procedure Laterality Date    TYMPANOSTOMY TUBE PLACEMENT      last assessed: 04/06/2017; remove chornic fluid    WISDOM TOOTH EXTRACTION           Social History     Socioeconomic History    Marital status: Single     Spouse name: None    Number of children: None    Years of education: None    Highest education level: None   Occupational History    Occupation: Student   Social Needs    Financial resource strain: None    Food insecurity:     Worry: None     Inability: None    Transportation needs:     Medical: None     Non-medical: None   Tobacco Use    Smoking status: Never Smoker    Smokeless tobacco: Never Used   Substance and Sexual Activity    Alcohol use: No    Drug use: No    Sexual activity: Yes     Partners: Male Birth control/protection: OCP, Condom Male   Lifestyle    Physical activity:     Days per week: None     Minutes per session: None    Stress: None   Relationships    Social connections:     Talks on phone: None     Gets together: None     Attends Yazdanism service: None     Active member of club or organization: None     Attends meetings of clubs or organizations: None     Relationship status: None    Intimate partner violence:     Fear of current or ex partner: None     Emotionally abused: None     Physically abused: None     Forced sexual activity: None   Other Topics Concern    None   Social History Narrative    Feels safe at home           History   Alcohol use: Not on file       History   Smoking Status    Not on file   Smokeless Tobacco    Not on file       Family History:   Family History   Problem Relation Age of Onset    Diabetes type II Mother         without complications    Heart attack Mother     Hypertension Father         benign essential     Diabetes Maternal Grandmother     Ovarian cancer Other     Diabetes type II Other         without complications       Medications/Allergies:      Current Outpatient Medications:     amphetamine-dextroamphetamine (ADDERALL) 5 MG tablet, Take 1 tablet (5 mg total) by mouth daily Max Daily Amount: 5 mg, Disp: 30 tablet, Rfl: 0    Armodafinil (NUVIGIL) 150 MG tablet, One by mouth every morning, Disp: 30 tablet, Rfl: 2    cetirizine (ZyrTEC) 10 mg tablet, Take 10 mg by mouth daily, Disp: , Rfl:     Cholecalciferol (CVS VITAMIN D3) 1000 units capsule, Take by mouth, Disp: , Rfl:     desogestrel-ethinyl estradiol (KARIVA) 0 15-0 02/0 01 MG (21/5) per tablet, Take 1 tablet by mouth daily, Disp: 84 tablet, Rfl: 3    pantoprazole (PROTONIX) 40 mg tablet, Take 1 tablet (40 mg total) by mouth daily, Disp: 90 tablet, Rfl: 3    Sodium Oxybate (XYREM) 500 MG/ML SOLN, First dose (bedtime): 3 g + Second dose (2 5-4 hrs later): 3 g = 6 g Total Nightly Dose [Total Qty: 1 month(s)], Disp: 180 g, Rfl: 2      Objective    Vital Signs:   Vitals:    07/31/19 1346   BP: 140/100   Weight: 75 8 kg (167 lb)   Height: 5' 9" (1 753 m)     [place] Sleepiness Scale: Total score: 8    Physical Exam:    General: Alert, appropriate, cooperative, overweight    Head: NC/AT    Skin: Warm, dry    Neuro: No motor abnormalities, cranial nerves appear intact    Psych: Normal affect            EDGAR Robbins    Board Certified Sleep Specialist

## 2019-08-01 DIAGNOSIS — G47.419 PRIMARY NARCOLEPSY WITHOUT CATAPLEXY: ICD-10-CM

## 2019-08-01 RX ORDER — ARMODAFINIL 150 MG/1
TABLET ORAL
Qty: 30 TABLET | Refills: 2 | Status: SHIPPED | OUTPATIENT
Start: 2019-08-01 | End: 2019-11-20 | Stop reason: SDUPTHER

## 2019-08-13 DIAGNOSIS — G47.419 PRIMARY NARCOLEPSY WITHOUT CATAPLEXY: ICD-10-CM

## 2019-11-05 DIAGNOSIS — G47.419 PRIMARY NARCOLEPSY WITHOUT CATAPLEXY: ICD-10-CM

## 2019-11-20 DIAGNOSIS — G47.419 PRIMARY NARCOLEPSY WITHOUT CATAPLEXY: ICD-10-CM

## 2019-11-20 RX ORDER — ARMODAFINIL 150 MG/1
TABLET ORAL
Qty: 30 TABLET | Refills: 1 | Status: SHIPPED | OUTPATIENT
Start: 2019-11-20 | End: 2020-02-06 | Stop reason: SDUPTHER

## 2019-12-17 ENCOUNTER — TELEPHONE (OUTPATIENT)
Dept: SLEEP CENTER | Facility: CLINIC | Age: 24
End: 2019-12-17

## 2019-12-17 NOTE — TELEPHONE ENCOUNTER
Patient called and reports that she got paperwork from [facility] that her Armodafinil may need a new prior authorization  She is going to scan in the paper work to my chart

## 2020-01-02 ENCOUNTER — TELEPHONE (OUTPATIENT)
Dept: SLEEP CENTER | Facility: CLINIC | Age: 25
End: 2020-01-02

## 2020-01-02 NOTE — TELEPHONE ENCOUNTER
DR Guerra Manual requesting that we notify patient to make sure she is aware that Nuvigil may inactivate contraception  Patient aware

## 2020-01-08 ENCOUNTER — OFFICE VISIT (OUTPATIENT)
Dept: FAMILY MEDICINE CLINIC | Facility: CLINIC | Age: 25
End: 2020-01-08
Payer: COMMERCIAL

## 2020-01-08 VITALS
OXYGEN SATURATION: 96 % | WEIGHT: 171 LBS | TEMPERATURE: 98.1 F | SYSTOLIC BLOOD PRESSURE: 122 MMHG | HEART RATE: 98 BPM | DIASTOLIC BLOOD PRESSURE: 80 MMHG | BODY MASS INDEX: 25.33 KG/M2 | HEIGHT: 69 IN

## 2020-01-08 DIAGNOSIS — J01.90 ACUTE SINUSITIS, RECURRENCE NOT SPECIFIED, UNSPECIFIED LOCATION: Primary | ICD-10-CM

## 2020-01-08 PROCEDURE — 1036F TOBACCO NON-USER: CPT | Performed by: FAMILY MEDICINE

## 2020-01-08 PROCEDURE — 99213 OFFICE O/P EST LOW 20 MIN: CPT | Performed by: FAMILY MEDICINE

## 2020-01-08 PROCEDURE — 3008F BODY MASS INDEX DOCD: CPT | Performed by: FAMILY MEDICINE

## 2020-01-08 RX ORDER — AMOXICILLIN AND CLAVULANATE POTASSIUM 875; 125 MG/1; MG/1
1 TABLET, FILM COATED ORAL EVERY 12 HOURS SCHEDULED
Qty: 20 TABLET | Refills: 0 | Status: SHIPPED | OUTPATIENT
Start: 2020-01-08 | End: 2020-01-18

## 2020-01-08 RX ORDER — BROMPHENIRAMINE MALEATE, PSEUDOEPHEDRINE HYDROCHLORIDE, AND DEXTROMETHORPHAN HYDROBROMIDE 2; 30; 10 MG/5ML; MG/5ML; MG/5ML
5 SYRUP ORAL 4 TIMES DAILY PRN
Qty: 180 ML | Refills: 0 | Status: SHIPPED | OUTPATIENT
Start: 2020-01-08 | End: 2020-10-27 | Stop reason: ALTCHOICE

## 2020-01-08 NOTE — PROGRESS NOTES
BMI Counseling: Body mass index is 25 25 kg/m²  The BMI is above normal  Nutrition recommendations include reducing portion sizes, decreasing overall calorie intake and 3-5 servings of fruits/vegetables daily  Exercise recommendations include exercising 3-5 times per week  Patient ID: Tarik Mendez is a 22 y o  female  HPI: 22 y  o female presenting with symptoms of sinus pain,pressure, nasal congestion, pnd dry cough , ear and throat pain  Symptoms for past 5 days        SUBJECTIVE    Family History   Problem Relation Age of Onset    Diabetes type II Mother         without complications    Heart attack Mother     Hypertension Father         benign essential     Diabetes Maternal Grandmother     Ovarian cancer Other     Diabetes type II Other         without complications     Social History     Socioeconomic History    Marital status: Single     Spouse name: Not on file    Number of children: Not on file    Years of education: Not on file    Highest education level: Not on file   Occupational History    Occupation: Student   Social Needs    Financial resource strain: Not on file    Food insecurity:     Worry: Not on file     Inability: Not on file    Transportation needs:     Medical: Not on file     Non-medical: Not on file   Tobacco Use    Smoking status: Never Smoker    Smokeless tobacco: Never Used   Substance and Sexual Activity    Alcohol use: No    Drug use: No    Sexual activity: Yes     Partners: Male     Birth control/protection: OCP, Condom Male   Lifestyle    Physical activity:     Days per week: Not on file     Minutes per session: Not on file    Stress: Not on file   Relationships    Social connections:     Talks on phone: Not on file     Gets together: Not on file     Attends Hinduism service: Not on file     Active member of club or organization: Not on file     Attends meetings of clubs or organizations: Not on file     Relationship status: Not on file    Intimate partner violence:     Fear of current or ex partner: Not on file     Emotionally abused: Not on file     Physically abused: Not on file     Forced sexual activity: Not on file   Other Topics Concern    Not on file   Social History Narrative    Feels safe at home     Past Medical History:   Diagnosis Date    Hearing impaired person, bilateral     Narcolepsy      Past Surgical History:   Procedure Laterality Date    TYMPANOSTOMY TUBE PLACEMENT      last assessed: 04/06/2017; remove chornic fluid    WISDOM TOOTH EXTRACTION       Allergies   Allergen Reactions    Albuterol Other (See Comments) and Rash     unknown       Current Outpatient Medications:     amphetamine-dextroamphetamine (ADDERALL) 5 MG tablet, Take 1 tablet (5 mg total) by mouth daily Max Daily Amount: 5 mg, Disp: 30 tablet, Rfl: 0    Armodafinil (NUVIGIL) 150 MG tablet, One by mouth every morning, Disp: 30 tablet, Rfl: 1    cetirizine (ZyrTEC) 10 mg tablet, Take 10 mg by mouth daily, Disp: , Rfl:     Cholecalciferol (CVS VITAMIN D3) 1000 units capsule, Take by mouth, Disp: , Rfl:     desogestrel-ethinyl estradiol (KARIVA) 0 15-0 02/0 01 MG (21/5) per tablet, Take 1 tablet by mouth daily, Disp: 84 tablet, Rfl: 3    pantoprazole (PROTONIX) 40 mg tablet, Take 1 tablet (40 mg total) by mouth daily, Disp: 90 tablet, Rfl: 3    Sodium Oxybate (XYREM) 500 MG/ML SOLN, First dose (bedtime): 3 g + Second dose (2 5-4 hrs later): 3 g = 6 g Total Nightly Dose [Total Qty: 1 month(s)], Disp: 180 g, Rfl: 2    amoxicillin-clavulanate (AUGMENTIN) 875-125 mg per tablet, Take 1 tablet by mouth every 12 (twelve) hours for 10 days, Disp: 20 tablet, Rfl: 0    brompheniramine-pseudoephedrine-DM 30-2-10 MG/5ML syrup, Take 5 mL by mouth 4 (four) times a day as needed for congestion or cough, Disp: 180 mL, Rfl: 0    Review of Systems  Constitutional:     Denies fever, chills, fatigue, weakness ,weight loss, weight gain      ENT: Denies earache, loss of hearing, nosebleed, nasal discharge,but complains of nasal congestion, sore throat,hoarseness and sinus pain and pressure    Pulmonary: Denies shortness of breath ,cough , dyspnea on exertionon, orthopnea ,+ PND   Cardiovascular:  Denies bradycardia , tachycardia ,palpations, lower extremity, edema leg, claudication  Breast:  Denies new or changing breast lumps,  nipple discharge, nipple changes,  Abdomen:  Denies abdominal pain , anorexia ,indigestion, nausea ,vomiting, constipation , diarrhea  Musculoskeletal: Denies myalgias, arthralgias, joint swelling, joint stiffness ,limb pain, limb swelling  Lymph:+ swollen glands  Gu: no dysuria or urinary frequency  Skin: Denies skin rash, skin lesion, skin wound, itching,dry skin  Neuro: Denies headache, numbness, tingling, confusion, loss of consciousness, dizziness ,vertigo  Psychiatric: Denies feelings of depression, suicidal ideation, anxiety, sleep disturbances    OBJECTIVE  /80   Pulse 98   Temp 98 1 °F (36 7 °C)   Ht 5' 9" (1 753 m)   Wt 77 6 kg (171 lb)   SpO2 96%   BMI 25 25 kg/m²   Constitutional:   NAD, well appearing and well nourished      ENT:   Conjunctiva and lids: no injection, edema, or discharge    Pupils and iris: AUGUSTINA bilaterally   External inspection of ears and nose: normal without deformities or discharge  Otoscopic exam: Canals patent ; tm are dull, with with erythem and effusions  ENasal mucosa, septum and turbinates: Turbinae injection with discharge   Oropharynx:  Moist mucosa, normal tongue and tonsils without lesions  Erythema and injection  of post pharynx with pnd      Pulmonary:Respiratory effort normal rate and rhythm, no increased work of breathing   Auscultation of lungs:  Clear bilaterally with no adventitious breath sounds       Cardiovascular: regular rate and rhythm, S1 and S2, no murmur, no edema and/or varicosities of LE      Abdomen: Soft and non-distended    Positive bowel sounds    No heptomegaly or splenomegaly Lymphatic: Anterior  cervical lymphadenopathy         Muscskeletal:  Gait and station: Normal gait     Digits and nails normal without clubbing or cyanosis     Inspection/palpation of joints, bones, and muscles:  No joint tenderness, swelling, full active and passive range of motion      Gu: no suprabubic tenderness, CVA tenderness or urethral discharge  Skin: Normal skin turgor and no rashes    Neuro:    Normal reflexes   Psych:   alert and oriented to person, place and time  normal mood and affect      Assessment/Plan:Diagnoses and all orders for this visit:    Acute sinusitis, recurrence not specified, unspecified location  -     brompheniramine-pseudoephedrine-DM 30-2-10 MG/5ML syrup; Take 5 mL by mouth 4 (four) times a day as needed for congestion or cough  -     amoxicillin-clavulanate (AUGMENTIN) 875-125 mg per tablet; Take 1 tablet by mouth every 12 (twelve) hours for 10 days        Reviewed with patient plan to treat with above plan      Patient instructed to call in 72 hours if not feeling better or if symptoms worsen

## 2020-01-17 ENCOUNTER — DOCUMENTATION (OUTPATIENT)
Dept: AUDIOLOGY | Age: 25
End: 2020-01-17

## 2020-01-17 NOTE — PROGRESS NOTES
Progress Note    Name:  Darci Ulloa  :  1995  Age:  22 y o  Date of Evaluation: 20     Scanned in HA chart         Twyla Sampson , CCC-A  Clinical Audiologist

## 2020-01-31 ENCOUNTER — APPOINTMENT (OUTPATIENT)
Dept: RADIOLOGY | Facility: CLINIC | Age: 25
End: 2020-01-31
Payer: OTHER MISCELLANEOUS

## 2020-01-31 ENCOUNTER — APPOINTMENT (OUTPATIENT)
Dept: URGENT CARE | Facility: CLINIC | Age: 25
End: 2020-01-31
Payer: OTHER MISCELLANEOUS

## 2020-01-31 DIAGNOSIS — S69.92XA INJURY OF LEFT HAND, INITIAL ENCOUNTER: Primary | ICD-10-CM

## 2020-01-31 DIAGNOSIS — S69.92XA INJURY OF LEFT HAND, INITIAL ENCOUNTER: ICD-10-CM

## 2020-01-31 PROCEDURE — G0382 LEV 3 HOSP TYPE B ED VISIT: HCPCS

## 2020-01-31 PROCEDURE — 99283 EMERGENCY DEPT VISIT LOW MDM: CPT

## 2020-01-31 PROCEDURE — 73080 X-RAY EXAM OF ELBOW: CPT

## 2020-02-03 ENCOUNTER — APPOINTMENT (OUTPATIENT)
Dept: URGENT CARE | Facility: CLINIC | Age: 25
End: 2020-02-03
Payer: OTHER MISCELLANEOUS

## 2020-02-03 PROCEDURE — 99213 OFFICE O/P EST LOW 20 MIN: CPT

## 2020-02-06 DIAGNOSIS — G47.419 PRIMARY NARCOLEPSY WITHOUT CATAPLEXY: ICD-10-CM

## 2020-02-07 NOTE — TELEPHONE ENCOUNTER
Patient called to ask status of refill request- advised that office policy is allow 7 days for refills

## 2020-02-11 RX ORDER — ARMODAFINIL 150 MG/1
TABLET ORAL
Qty: 30 TABLET | Refills: 1 | Status: SHIPPED | OUTPATIENT
Start: 2020-02-11 | End: 2020-03-13 | Stop reason: DRUGHIGH

## 2020-02-17 DIAGNOSIS — G47.419 PRIMARY NARCOLEPSY WITHOUT CATAPLEXY: ICD-10-CM

## 2020-03-04 ENCOUNTER — OFFICE VISIT (OUTPATIENT)
Dept: SLEEP CENTER | Facility: CLINIC | Age: 25
End: 2020-03-04
Payer: COMMERCIAL

## 2020-03-04 VITALS
BODY MASS INDEX: 25.46 KG/M2 | DIASTOLIC BLOOD PRESSURE: 88 MMHG | WEIGHT: 168 LBS | HEART RATE: 88 BPM | SYSTOLIC BLOOD PRESSURE: 142 MMHG | HEIGHT: 68 IN

## 2020-03-04 DIAGNOSIS — T50.905A MEDICATION ADVERSE EFFECT, INITIAL ENCOUNTER: Primary | ICD-10-CM

## 2020-03-04 PROCEDURE — 99214 OFFICE O/P EST MOD 30 MIN: CPT | Performed by: INTERNAL MEDICINE

## 2020-03-04 PROCEDURE — 3008F BODY MASS INDEX DOCD: CPT | Performed by: INTERNAL MEDICINE

## 2020-03-04 PROCEDURE — 1036F TOBACCO NON-USER: CPT | Performed by: INTERNAL MEDICINE

## 2020-03-04 NOTE — PROGRESS NOTES
Progress Note - Sleep Center   [facility] :1995 MRN: [id number]      Reason for Visit:    22 y  o female with narcolepsy    Assessment:  The patient developed hypnagogic hallucinations, but not frequently enough that it is interfering with her day-to-day life  I would like to start Wakix  She will consider it  Her blood pressure remains high  Perhaps I can wean her off of Nuvigil  Plan:  Consider change in medication as above  Check EKG    Follow up: Three months    History of Present Illness:  Narcolepsy with cataplexy  The patient remains on Xyrem 3 g twice nightly, Nuvigil 150 mg daily and Adderall, short-acting, as needed  She requires Adderall only once a week        Review of Systems      Genitourinary none   Cardiology none   Gastrointestinal none   Neurology forgetfulness and poor concentration or confusion,    Constitutional claustrophobia   Integumentary rash or dry skin   Psychiatry anxiety   Musculoskeletal none   Pulmonary none   ENT none   Endocrine excessive thirst   Hematological none           I have reviewed and updated the review of systems as necessary     Historical Information    Past Medical History:   Diagnosis Date    Hearing impaired person, bilateral     Narcolepsy          Past Surgical History:   Procedure Laterality Date    TYMPANOSTOMY TUBE PLACEMENT      last assessed: 2017; remove chornic fluid    WISDOM TOOTH EXTRACTION           Social History     Socioeconomic History    Marital status: Single     Spouse name: None    Number of children: None    Years of education: None    Highest education level: None   Occupational History    Occupation: Student   Social Needs    Financial resource strain: None    Food insecurity:     Worry: None     Inability: None    Transportation needs:     Medical: None     Non-medical: None   Tobacco Use    Smoking status: Never Smoker    Smokeless tobacco: Never Used   Substance and Sexual Activity    Alcohol use: No    Drug use: No    Sexual activity: Yes     Partners: Male     Birth control/protection: OCP, Condom Male   Lifestyle    Physical activity:     Days per week: None     Minutes per session: None    Stress: None   Relationships    Social connections:     Talks on phone: None     Gets together: None     Attends Anglican service: None     Active member of club or organization: None     Attends meetings of clubs or organizations: None     Relationship status: None    Intimate partner violence:     Fear of current or ex partner: None     Emotionally abused: None     Physically abused: None     Forced sexual activity: None   Other Topics Concern    None   Social History Narrative    Feels safe at home           History   Alcohol use: Not on file       History   Smoking Status    Not on file   Smokeless Tobacco    Not on file       Family History:   Family History   Problem Relation Age of Onset    Diabetes type II Mother         without complications    Heart attack Mother     Hypertension Father         benign essential     Diabetes Maternal Grandmother     Ovarian cancer Other     Diabetes type II Other         without complications       Medications/Allergies:      Current Outpatient Medications:     amphetamine-dextroamphetamine (ADDERALL) 5 MG tablet, Take 1 tablet (5 mg total) by mouth daily Max Daily Amount: 5 mg, Disp: 30 tablet, Rfl: 0    Armodafinil (NUVIGIL) 150 MG tablet, One by mouth every morning, Disp: 30 tablet, Rfl: 1    brompheniramine-pseudoephedrine-DM 30-2-10 MG/5ML syrup, Take 5 mL by mouth 4 (four) times a day as needed for congestion or cough, Disp: 180 mL, Rfl: 0    cetirizine (ZyrTEC) 10 mg tablet, Take 10 mg by mouth daily, Disp: , Rfl:     Cholecalciferol (CVS VITAMIN D3) 1000 units capsule, Take by mouth, Disp: , Rfl:     desogestrel-ethinyl estradiol (KARIVA) 0 15-0 02/0 01 MG (21/5) per tablet, Take 1 tablet by mouth daily, Disp: 84 tablet, Rfl: 3   pantoprazole (PROTONIX) 40 mg tablet, Take 1 tablet (40 mg total) by mouth daily, Disp: 90 tablet, Rfl: 3    Sodium Oxybate (Xyrem) 500 MG/ML SOLN, First dose (bedtime): 3 g + Second dose (2 5-4 hrs later): 3 g = 6 g Total Nightly Dose [Total Qty: 1 month(s)], Disp: 180 g, Rfl: 0      Objective    Vital Signs:   Vitals:    03/04/20 1500   BP: 142/88   Pulse: 88   Weight: 76 2 kg (168 lb)   Height: 5' 8" (1 727 m)     [place] Sleepiness Scale: Total score: 11    Physical Exam:    General: Alert, appropriate, cooperative, overweight    Head: NC/AT    Skin: Warm, dry    Neuro: No motor abnormalities, cranial nerves appear intact    Psych: Normal affect            EDGAR Malcolm    Board Certified Sleep Specialist

## 2020-03-05 ENCOUNTER — TELEPHONE (OUTPATIENT)
Dept: SLEEP CENTER | Facility: CLINIC | Age: 25
End: 2020-03-05

## 2020-03-09 DIAGNOSIS — G47.419 PRIMARY NARCOLEPSY WITHOUT CATAPLEXY: ICD-10-CM

## 2020-03-09 DIAGNOSIS — K21.9 GASTROESOPHAGEAL REFLUX DISEASE WITHOUT ESOPHAGITIS: ICD-10-CM

## 2020-03-09 RX ORDER — PANTOPRAZOLE SODIUM 40 MG/1
TABLET, DELAYED RELEASE ORAL
Qty: 90 TABLET | Refills: 3 | Status: SHIPPED | OUTPATIENT
Start: 2020-03-09 | End: 2021-02-26

## 2020-03-10 ENCOUNTER — TELEPHONE (OUTPATIENT)
Dept: SLEEP CENTER | Facility: CLINIC | Age: 25
End: 2020-03-10

## 2020-03-11 ENCOUNTER — TELEPHONE (OUTPATIENT)
Dept: SLEEP CENTER | Facility: CLINIC | Age: 25
End: 2020-03-11

## 2020-03-13 ENCOUNTER — OFFICE VISIT (OUTPATIENT)
Dept: SLEEP CENTER | Facility: CLINIC | Age: 25
End: 2020-03-13
Payer: COMMERCIAL

## 2020-03-13 ENCOUNTER — OFFICE VISIT (OUTPATIENT)
Dept: LAB | Facility: HOSPITAL | Age: 25
End: 2020-03-13
Attending: INTERNAL MEDICINE
Payer: COMMERCIAL

## 2020-03-13 VITALS
DIASTOLIC BLOOD PRESSURE: 90 MMHG | HEIGHT: 68 IN | HEART RATE: 78 BPM | BODY MASS INDEX: 24.86 KG/M2 | SYSTOLIC BLOOD PRESSURE: 140 MMHG | WEIGHT: 164 LBS

## 2020-03-13 DIAGNOSIS — G47.411 PRIMARY NARCOLEPSY WITH CATAPLEXY: Primary | ICD-10-CM

## 2020-03-13 DIAGNOSIS — T50.905A MEDICATION ADVERSE EFFECT, INITIAL ENCOUNTER: ICD-10-CM

## 2020-03-13 PROCEDURE — 99214 OFFICE O/P EST MOD 30 MIN: CPT | Performed by: INTERNAL MEDICINE

## 2020-03-13 PROCEDURE — 93005 ELECTROCARDIOGRAM TRACING: CPT

## 2020-03-13 PROCEDURE — 1036F TOBACCO NON-USER: CPT | Performed by: INTERNAL MEDICINE

## 2020-03-13 PROCEDURE — 3008F BODY MASS INDEX DOCD: CPT | Performed by: INTERNAL MEDICINE

## 2020-03-13 RX ORDER — ARMODAFINIL 150 MG/1
150 TABLET ORAL DAILY
Qty: 30 TABLET | Refills: 0 | Status: SHIPPED | OUTPATIENT
Start: 2020-03-13 | End: 2020-04-08 | Stop reason: SDUPTHER

## 2020-03-13 NOTE — PROGRESS NOTES
Progress Note - Sleep Center   Kamala Bai :1995 MRN: [id number]      Reason for Visit:    22 y  o female with narcolepsy    Assessment:  The patient has narcolepsy is fairly well controlled on Xyrem and armodafinil  She has been hypertensive, which she suspects may be result of her generic form of armodafinil  When she was on brand-name Nuvigil, she did not have this problem  I will order brand-name new visual see if there is any difference  Alternatively, I may start Sunosi  She prefers to wait before trying Wakix  Plan:  Start brand-name Nuvigil  Check EKG for possible use of Wakix    Follow up: Three months    History of Present Illness:  History of narcolepsy with cataplexy  Doing reasonably well on current regimen  She still has drowsiness at times    She also has refractory hypertension and tachycardia    Review of Systems      Genitourinary none   Cardiology none   Gastrointestinal none   Neurology forgetfulness, poor concentration or confusion,  and difficulty with memory   Constitutional claustrophobia   Integumentary none   Psychiatry anxiety, aggressiveness or irritability and mood change   Musculoskeletal none   Pulmonary none   ENT none   Endocrine none   Hematological none           I have reviewed and updated the review of systems as necessary     Historical Information    Past Medical History:   Diagnosis Date    Hearing impaired person, bilateral     Narcolepsy          Past Surgical History:   Procedure Laterality Date    TYMPANOSTOMY TUBE PLACEMENT      last assessed: 2017; remove chornic fluid    WISDOM TOOTH EXTRACTION           Social History     Socioeconomic History    Marital status: Single     Spouse name: None    Number of children: None    Years of education: None    Highest education level: None   Occupational History    Occupation: Student   Social Needs    Financial resource strain: None    Food insecurity:     Worry: None     Inability: None  Transportation needs:     Medical: None     Non-medical: None   Tobacco Use    Smoking status: Never Smoker    Smokeless tobacco: Never Used   Substance and Sexual Activity    Alcohol use: No    Drug use: No    Sexual activity: Yes     Partners: Male     Birth control/protection: OCP, Condom Male   Lifestyle    Physical activity:     Days per week: None     Minutes per session: None    Stress: None   Relationships    Social connections:     Talks on phone: None     Gets together: None     Attends Sabianist service: None     Active member of club or organization: None     Attends meetings of clubs or organizations: None     Relationship status: None    Intimate partner violence:     Fear of current or ex partner: None     Emotionally abused: None     Physically abused: None     Forced sexual activity: None   Other Topics Concern    None   Social History Narrative    Feels safe at home           History   Alcohol use: Not on file       History   Smoking Status    Not on file   Smokeless Tobacco    Not on file       Family History:   Family History   Problem Relation Age of Onset    Diabetes type II Mother         without complications    Heart attack Mother     Hypertension Father         benign essential     Diabetes Maternal Grandmother     Ovarian cancer Other     Diabetes type II Other         without complications       Medications/Allergies:      Current Outpatient Medications:     amphetamine-dextroamphetamine (ADDERALL) 5 MG tablet, Take 1 tablet (5 mg total) by mouth daily Max Daily Amount: 5 mg, Disp: 30 tablet, Rfl: 0    Armodafinil (NUVIGIL) 150 MG tablet, One by mouth every morning, Disp: 30 tablet, Rfl: 1    brompheniramine-pseudoephedrine-DM 30-2-10 MG/5ML syrup, Take 5 mL by mouth 4 (four) times a day as needed for congestion or cough, Disp: 180 mL, Rfl: 0    cetirizine (ZyrTEC) 10 mg tablet, Take 10 mg by mouth daily, Disp: , Rfl:     Cholecalciferol (CVS VITAMIN D3) 1000 units capsule, Take by mouth, Disp: , Rfl:     desogestrel-ethinyl estradiol (KARIVA) 0 15-0 02/0 01 MG (21/5) per tablet, Take 1 tablet by mouth daily, Disp: 84 tablet, Rfl: 3    pantoprazole (PROTONIX) 40 mg tablet, TAKE 1 TABLET(40 MG) BY MOUTH DAILY, Disp: 90 tablet, Rfl: 3    Sodium Oxybate (Xyrem) 500 MG/ML SOLN, First dose (bedtime): 3 g + Second dose (2 5-4 hrs later): 3 g = 6 g Total Nightly Dose [Total Qty: 1 month(s)], Disp: 180 g, Rfl: 3      Objective    Vital Signs:   Vitals:    03/13/20 0844   BP: 140/90   BP Location: Left arm   Patient Position: Sitting   Cuff Size: Standard   Pulse: 78   Weight: 74 4 kg (164 lb)   Height: 5' 8" (1 727 m)     [place]coe Sleepiness Scale: Total score: 10    Physical Exam:    General: Alert, appropriate, cooperative, overweight    Head: NC/AT    Skin: Warm, dry    Neuro: No motor abnormalities, cranial nerves appear intact    Psych: Normal affect            EDGAR Napoles    Board Certified Sleep Specialist

## 2020-03-15 LAB
ATRIAL RATE: 91 BPM
P AXIS: 53 DEGREES
PR INTERVAL: 140 MS
QRS AXIS: 40 DEGREES
QRSD INTERVAL: 76 MS
QT INTERVAL: 360 MS
QTC INTERVAL: 442 MS
T WAVE AXIS: 46 DEGREES
VENTRICULAR RATE: 91 BPM

## 2020-03-15 PROCEDURE — 93010 ELECTROCARDIOGRAM REPORT: CPT | Performed by: INTERNAL MEDICINE

## 2020-04-08 DIAGNOSIS — G47.411 PRIMARY NARCOLEPSY WITH CATAPLEXY: ICD-10-CM

## 2020-04-09 RX ORDER — ARMODAFINIL 150 MG/1
150 TABLET ORAL DAILY
Qty: 30 TABLET | Refills: 3 | Status: SHIPPED | OUTPATIENT
Start: 2020-04-09 | End: 2020-10-07 | Stop reason: SDUPTHER

## 2020-05-05 ENCOUNTER — TELEPHONE (OUTPATIENT)
Dept: FAMILY MEDICINE CLINIC | Facility: CLINIC | Age: 25
End: 2020-05-05

## 2020-05-06 ENCOUNTER — CLINICAL SUPPORT (OUTPATIENT)
Dept: FAMILY MEDICINE CLINIC | Facility: CLINIC | Age: 25
End: 2020-05-06
Payer: COMMERCIAL

## 2020-05-06 DIAGNOSIS — Z23 ENCOUNTER FOR IMMUNIZATION: Primary | ICD-10-CM

## 2020-05-06 PROCEDURE — 86580 TB INTRADERMAL TEST: CPT | Performed by: FAMILY MEDICINE

## 2020-05-07 ENCOUNTER — TELEPHONE (OUTPATIENT)
Dept: SLEEP CENTER | Facility: CLINIC | Age: 25
End: 2020-05-07

## 2020-05-08 ENCOUNTER — CLINICAL SUPPORT (OUTPATIENT)
Dept: FAMILY MEDICINE CLINIC | Facility: CLINIC | Age: 25
End: 2020-05-08

## 2020-05-08 DIAGNOSIS — Z11.1 ENCOUNTER FOR PPD SKIN TEST READING: Primary | ICD-10-CM

## 2020-05-08 LAB
INDURATION: 0 MM
TB SKIN TEST: NEGATIVE

## 2020-06-03 ENCOUNTER — TELEMEDICINE (OUTPATIENT)
Dept: SLEEP CENTER | Facility: CLINIC | Age: 25
End: 2020-06-03
Payer: COMMERCIAL

## 2020-06-03 DIAGNOSIS — G47.411 PRIMARY NARCOLEPSY WITH CATAPLEXY: Primary | ICD-10-CM

## 2020-06-03 PROCEDURE — 1036F TOBACCO NON-USER: CPT | Performed by: INTERNAL MEDICINE

## 2020-06-03 PROCEDURE — 99213 OFFICE O/P EST LOW 20 MIN: CPT | Performed by: INTERNAL MEDICINE

## 2020-07-14 DIAGNOSIS — G47.419 PRIMARY NARCOLEPSY WITHOUT CATAPLEXY: ICD-10-CM

## 2020-08-10 DIAGNOSIS — Z30.41 ENCOUNTER FOR SURVEILLANCE OF CONTRACEPTIVE PILLS: ICD-10-CM

## 2020-08-11 RX ORDER — DESOG-E.ESTRADIOL/E.ESTRADIOL 21-5 (28)
TABLET ORAL
Qty: 84 TABLET | Refills: 0 | Status: SHIPPED | OUTPATIENT
Start: 2020-08-11 | End: 2020-11-06 | Stop reason: SDUPTHER

## 2020-08-25 ENCOUNTER — TELEPHONE (OUTPATIENT)
Dept: SLEEP CENTER | Facility: CLINIC | Age: 25
End: 2020-08-25

## 2020-08-25 NOTE — TELEPHONE ENCOUNTER
Patient called, requesting refill on Xyrem, I advised she should have refills remaining, she will call [facility] pharmacy and call back with questions or concerns

## 2020-09-04 ENCOUNTER — TELEPHONE (OUTPATIENT)
Dept: SLEEP CENTER | Facility: CLINIC | Age: 25
End: 2020-09-04

## 2020-09-04 NOTE — TELEPHONE ENCOUNTER
Authorization for Nuvigil 150 mg Brand necessary approved Case number [id number]  Coverage from August 5, 2020 - September 4, 2020    Pharmacy notified

## 2020-10-07 DIAGNOSIS — G47.411 PRIMARY NARCOLEPSY WITH CATAPLEXY: ICD-10-CM

## 2020-10-07 RX ORDER — ARMODAFINIL 150 MG/1
150 TABLET ORAL DAILY
Qty: 30 TABLET | Refills: 3 | Status: SHIPPED | OUTPATIENT
Start: 2020-10-07 | End: 2021-03-08 | Stop reason: SDUPTHER

## 2020-10-27 ENCOUNTER — OFFICE VISIT (OUTPATIENT)
Dept: FAMILY MEDICINE CLINIC | Facility: CLINIC | Age: 25
End: 2020-10-27
Payer: COMMERCIAL

## 2020-10-27 VITALS
DIASTOLIC BLOOD PRESSURE: 80 MMHG | BODY MASS INDEX: 26.07 KG/M2 | TEMPERATURE: 97.8 F | HEART RATE: 72 BPM | WEIGHT: 172 LBS | SYSTOLIC BLOOD PRESSURE: 124 MMHG | HEIGHT: 68 IN

## 2020-10-27 DIAGNOSIS — L25.8 CONTACT DERMATITIS DUE TO OTHER AGENT, UNSPECIFIED CONTACT DERMATITIS TYPE: Primary | ICD-10-CM

## 2020-10-27 PROCEDURE — 99213 OFFICE O/P EST LOW 20 MIN: CPT | Performed by: FAMILY MEDICINE

## 2020-10-27 PROCEDURE — 3725F SCREEN DEPRESSION PERFORMED: CPT | Performed by: FAMILY MEDICINE

## 2020-10-27 RX ORDER — METHYLPREDNISOLONE 4 MG/1
TABLET ORAL
Qty: 21 EACH | Refills: 0 | Status: SHIPPED | OUTPATIENT
Start: 2020-10-27 | End: 2022-04-13

## 2020-10-30 ENCOUNTER — ANNUAL EXAM (OUTPATIENT)
Dept: OBGYN CLINIC | Facility: CLINIC | Age: 25
End: 2020-10-30
Payer: COMMERCIAL

## 2020-10-30 VITALS
SYSTOLIC BLOOD PRESSURE: 168 MMHG | HEIGHT: 69 IN | BODY MASS INDEX: 25.15 KG/M2 | DIASTOLIC BLOOD PRESSURE: 112 MMHG | TEMPERATURE: 96.2 F | WEIGHT: 169.8 LBS

## 2020-10-30 DIAGNOSIS — Z01.419 GYNECOLOGIC EXAM NORMAL: Primary | ICD-10-CM

## 2020-10-30 PROCEDURE — 99395 PREV VISIT EST AGE 18-39: CPT | Performed by: PHYSICIAN ASSISTANT

## 2020-10-30 PROCEDURE — 3008F BODY MASS INDEX DOCD: CPT | Performed by: PHYSICIAN ASSISTANT

## 2020-11-06 ENCOUNTER — TELEPHONE (OUTPATIENT)
Dept: OBGYN CLINIC | Facility: CLINIC | Age: 25
End: 2020-11-06

## 2020-11-06 DIAGNOSIS — Z30.41 ENCOUNTER FOR SURVEILLANCE OF CONTRACEPTIVE PILLS: ICD-10-CM

## 2020-11-06 RX ORDER — DESOGESTREL AND ETHINYL ESTRADIOL 21-5 (28)
1 KIT ORAL DAILY
Qty: 84 TABLET | Refills: 0 | Status: SHIPPED | OUTPATIENT
Start: 2020-11-06 | End: 2020-11-09

## 2020-11-09 DIAGNOSIS — Z30.41 ENCOUNTER FOR SURVEILLANCE OF CONTRACEPTIVE PILLS: ICD-10-CM

## 2020-11-09 RX ORDER — DESOGESTREL AND ETHINYL ESTRADIOL 21-5 (28)
1 KIT ORAL DAILY
Qty: 84 TABLET | Refills: 0 | Status: SHIPPED | OUTPATIENT
Start: 2020-11-09 | End: 2021-01-28 | Stop reason: SDUPTHER

## 2020-11-13 ENCOUNTER — TELEPHONE (OUTPATIENT)
Dept: OBGYN CLINIC | Facility: CLINIC | Age: 25
End: 2020-11-13

## 2021-01-04 DIAGNOSIS — G47.419 PRIMARY NARCOLEPSY WITHOUT CATAPLEXY: ICD-10-CM

## 2021-01-05 RX ORDER — SODIUM OXYBATE 0.5 G/ML
SOLUTION ORAL
Qty: 180 G | Refills: 5 | Status: SHIPPED | OUTPATIENT
Start: 2021-01-05 | End: 2021-07-07 | Stop reason: SINTOL

## 2021-01-15 ENCOUNTER — TELEPHONE (OUTPATIENT)
Dept: FAMILY MEDICINE CLINIC | Facility: CLINIC | Age: 26
End: 2021-01-15

## 2021-01-15 NOTE — TELEPHONE ENCOUNTER
NEEDS A PHY TO PHY  AMBULATORY TO [facility]  SLEEP [facility]     [id number]  [address]

## 2021-01-18 NOTE — TELEPHONE ENCOUNTER
Cristo More, patient sees Dr Opal Diego who handles her sleep issues/narcolepsy  Order should come from him    I did not order anything pertaining to her sleep

## 2021-01-21 NOTE — TELEPHONE ENCOUNTER
Call back from Lauren Johnson from Neurology  Because patient has a medical assistance insurance there needs to be a doctor to doctor referral entered in patient's chart from the PCP in order for the insurance company to cover the visit   They know that she was referred by [place], but they need you to enter the referral for insurance purposes please

## 2021-01-22 ENCOUNTER — OFFICE VISIT (OUTPATIENT)
Dept: SLEEP CENTER | Facility: CLINIC | Age: 26
End: 2021-01-22
Payer: COMMERCIAL

## 2021-01-22 VITALS
SYSTOLIC BLOOD PRESSURE: 160 MMHG | HEIGHT: 69 IN | HEART RATE: 100 BPM | BODY MASS INDEX: 24.44 KG/M2 | WEIGHT: 165 LBS | DIASTOLIC BLOOD PRESSURE: 104 MMHG

## 2021-01-22 DIAGNOSIS — G47.419 PRIMARY NARCOLEPSY WITHOUT CATAPLEXY: Primary | ICD-10-CM

## 2021-01-22 DIAGNOSIS — G47.411 PRIMARY NARCOLEPSY WITH CATAPLEXY: Primary | ICD-10-CM

## 2021-01-22 DIAGNOSIS — G47.419 PRIMARY NARCOLEPSY WITHOUT CATAPLEXY: ICD-10-CM

## 2021-01-22 PROCEDURE — 3008F BODY MASS INDEX DOCD: CPT | Performed by: INTERNAL MEDICINE

## 2021-01-22 PROCEDURE — 99213 OFFICE O/P EST LOW 20 MIN: CPT | Performed by: INTERNAL MEDICINE

## 2021-01-22 PROCEDURE — 1036F TOBACCO NON-USER: CPT | Performed by: INTERNAL MEDICINE

## 2021-01-22 RX ORDER — (CALCIUM, MAGNESIUM, POTASSIUM, AND SODIUM OXYBATES) .5; .5; .5; .5 G/ML; G/ML; G/ML; G/ML
3 SOLUTION ORAL 2 TIMES DAILY
Qty: 180 ML | Refills: 5 | Status: SHIPPED | OUTPATIENT
Start: 2021-01-22 | End: 2021-07-07 | Stop reason: SDUPTHER

## 2021-01-22 NOTE — PROGRESS NOTES
Review of Systems      Genitourinary none   Cardiology none   Gastrointestinal none   Neurology numbness/tingling of an extremity, forgetfulness, poor concentration or confusion,  and difficulty with memory   Constitutional claustrophobia   Integumentary none   Psychiatry anxiety   Musculoskeletal none   Pulmonary snoring   ENT none   Endocrine none   Hematological none

## 2021-01-26 ENCOUNTER — TELEPHONE (OUTPATIENT)
Dept: SLEEP CENTER | Facility: CLINIC | Age: 26
End: 2021-01-26

## 2021-01-26 NOTE — PROGRESS NOTES
Progress Note - Sleep Center   Arnold Isbell :1995 MRN: [id number]      Reason for Visit:    26 y  o female with type 1 narcolepsy    Assessment:  The patient is doing well on her current regimen of Nuvigil 150 mg daily and Xyrem 3 g twice nightly  Unfortunately, the Xyrem seems to be causing elevated blood pressure  She has had problems with hypertension  In addition, the patient has a difficult time with the salty taste  Plan:  Switch to Xywav at same dose  Continue Nuvigil, brand necessary    Follow up:  Yearly    History of Present Illness: The patient is being treated for narcolepsy  She has had problems with hypertension  Review of Systems        Genitourinary none   Cardiology none   Gastrointestinal none   Neurology numbness/tingling of an extremity, forgetfulness, poor concentration or confusion,  and difficulty with memory   Constitutional claustrophobia   Integumentary none   Psychiatry anxiety   Musculoskeletal none   Pulmonary snoring   ENT none   Endocrine none   Hematological none             I have reviewed and updated the review of systems as necessary     Historical Information    Past Medical History:   Diagnosis Date    Hearing impaired person, bilateral     Narcolepsy          Past Surgical History:   Procedure Laterality Date    TYMPANOSTOMY TUBE PLACEMENT      last assessed: 2017; remove chornic fluid    WISDOM TOOTH EXTRACTION           Social History     Socioeconomic History    Marital status: Single     Spouse name: None    Number of children: None    Years of education: None    Highest education level: None   Occupational History    Occupation: Student   Social Needs    Financial resource strain: None    Food insecurity     Worry: None     Inability: None    Transportation needs     Medical: None     Non-medical: None   Tobacco Use    Smoking status: Never Smoker    Smokeless tobacco: Never Used   Substance and Sexual Activity    Alcohol use:  No  Drug use: No    Sexual activity: Yes     Partners: Male     Birth control/protection: OCP, Condom Male   Lifestyle    Physical activity     Days per week: None     Minutes per session: None    Stress: None   Relationships    Social connections     Talks on phone: None     Gets together: None     Attends Orthodox service: None     Active member of club or organization: None     Attends meetings of clubs or organizations: None     Relationship status: None    Intimate partner violence     Fear of current or ex partner: None     Emotionally abused: None     Physically abused: None     Forced sexual activity: None   Other Topics Concern    None   Social History Narrative    Feels safe at home           History   Alcohol use: Not on file       History   Smoking Status    Not on file   Smokeless Tobacco    Not on file       Family History:   Family History   Problem Relation Age of Onset    Diabetes type II Mother         without complications    Heart attack Mother     Hypertension Father         benign essential     Diabetes Maternal Grandmother     Ovarian cancer Other     Diabetes type II Other         without complications       Medications/Allergies:      Current Outpatient Medications:     cetirizine (ZyrTEC) 10 mg tablet, Take 10 mg by mouth daily, Disp: , Rfl:     Cholecalciferol (CVS VITAMIN D3) 1000 units capsule, Take by mouth, Disp: , Rfl:     desogestrel-ethinyl estradiol (KARIVA) 0 15-0 02/0 01 MG (21/5) per tablet, Take 1 tablet by mouth daily, Disp: 84 tablet, Rfl: 0    methylPREDNISolone 4 MG tablet therapy pack, Use as directed on package, Disp: 21 each, Rfl: 0    Nuvigil 150 MG tablet, Take 1 tablet (150 mg total) by mouth daily, Disp: 30 tablet, Rfl: 3    pantoprazole (PROTONIX) 40 mg tablet, TAKE 1 TABLET(40 MG) BY MOUTH DAILY, Disp: 90 tablet, Rfl: 3    Sodium Oxybate (Xyrem) 500 MG/ML SOLN, First dose (bedtime): 3 g + Second dose (2 5-4 hrs later): 3 g = 6 g Total Nightly Dose [Total Qty: 1 month(s)], Disp: 180 g, Rfl: 5    Ca, Mg, K, and Na Oxybates (Xywav) 500 MG/ML SOLN, Take 3 g by mouth 2 (two) times a day At bedtime and 2-4 hours later, Disp: 180 mL, Rfl: 5      Objective    Vital Signs:   Vitals:    01/22/21 1505   BP: (!) 160/104   Pulse: 100   Weight: 74 8 kg (165 lb)   Height: 5' 9" (1 753 m)     [place] Sleepiness Scale: Total score: 9    Physical Exam:    General: Alert, appropriate, cooperative, overweight    Head: NC/AT    Skin: Warm, dry    Neuro: No motor abnormalities, cranial nerves appear intact    Psych: Normal affect            Claudean Bean, M D    Board Certified Sleep Specialist

## 2021-01-26 NOTE — TELEPHONE ENCOUNTER
Received call from [facility] [address]  They received e-script for Xywav but do not dispense this medication  They will cancel script  Dr Gaurav Rosenthal, can you please complete [facility] MCCAU[facility] prescription form for patient? Thanks

## 2021-01-28 ENCOUNTER — TELEPHONE (OUTPATIENT)
Dept: OBGYN CLINIC | Facility: CLINIC | Age: 26
End: 2021-01-28

## 2021-01-28 DIAGNOSIS — Z30.41 ENCOUNTER FOR SURVEILLANCE OF CONTRACEPTIVE PILLS: ICD-10-CM

## 2021-01-28 RX ORDER — DESOGESTREL AND ETHINYL ESTRADIOL 21-5 (28)
1 KIT ORAL DAILY
Qty: 84 TABLET | Refills: 0 | Status: SHIPPED | OUTPATIENT
Start: 2021-01-28 | End: 2021-04-20

## 2021-01-28 NOTE — TELEPHONE ENCOUNTER
No, I sent her refill  She is following with specialist for narcolepsy  Elevated BP was being caused by her narcolepsy medication and they recently adjusted it

## 2021-02-03 NOTE — TELEPHONE ENCOUNTER
Spoke to Laura at [facility]  Advised that incorrect prescription form was faxed to [facility] for patient's Xywav  Form was for Xyrem  Ranulfo Demarco will disregard Xyrem form once it is received  Requested copies of Xywav prescription forms  Ranulfo Demarco will fax to office  Fax number provided  Once received will need to have Dr Martin Hawk complete [facility] prescription form  Will need to be faxed to [facility] once completed

## 2021-02-26 DIAGNOSIS — K21.9 GASTROESOPHAGEAL REFLUX DISEASE WITHOUT ESOPHAGITIS: ICD-10-CM

## 2021-02-26 RX ORDER — PANTOPRAZOLE SODIUM 40 MG/1
TABLET, DELAYED RELEASE ORAL
Qty: 90 TABLET | Refills: 3 | Status: SHIPPED | OUTPATIENT
Start: 2021-02-26 | End: 2022-03-10

## 2021-03-08 DIAGNOSIS — G47.411 PRIMARY NARCOLEPSY WITH CATAPLEXY: ICD-10-CM

## 2021-03-09 RX ORDER — ARMODAFINIL 150 MG/1
150 TABLET ORAL DAILY
Qty: 30 TABLET | Refills: 3 | Status: SHIPPED | OUTPATIENT
Start: 2021-03-09 | End: 2021-07-21 | Stop reason: SDUPTHER

## 2021-03-26 DIAGNOSIS — Z23 ENCOUNTER FOR IMMUNIZATION: ICD-10-CM

## 2021-04-19 DIAGNOSIS — Z30.41 ENCOUNTER FOR SURVEILLANCE OF CONTRACEPTIVE PILLS: ICD-10-CM

## 2021-04-20 RX ORDER — DESOG-E.ESTRADIOL/E.ESTRADIOL 21-5 (28)
TABLET ORAL
Qty: 84 TABLET | Refills: 2 | Status: SHIPPED | OUTPATIENT
Start: 2021-04-20 | End: 2021-12-28

## 2021-04-27 ENCOUNTER — TELEPHONE (OUTPATIENT)
Dept: SLEEP CENTER | Facility: CLINIC | Age: 26
End: 2021-04-27

## 2021-04-27 NOTE — TELEPHONE ENCOUNTER
Received message from [facility] pharmacy, states they have been trying to contact patient, need to verify phone #    Spoke with [facility] pharmacy, gave additional phone #'s  I spoke with patient, advised above  States she did get messages, she does not refill meds until the first of the month  I advised she should mention that to them  She will call them today

## 2021-06-28 ENCOUNTER — TELEPHONE (OUTPATIENT)
Dept: SLEEP CENTER | Facility: CLINIC | Age: 26
End: 2021-06-28

## 2021-06-28 NOTE — TELEPHONE ENCOUNTER
Received voice message from [facility] pharmacy stating they have been unable to contact patient and need alternate phone number  Called and spoke with Juan Chauhan, pharmacist at [facility]  States they have patient's mobile number but have been unable to reach her  Provided patient's home phone number

## 2021-07-07 DIAGNOSIS — G47.419 PRIMARY NARCOLEPSY WITHOUT CATAPLEXY: ICD-10-CM

## 2021-07-07 RX ORDER — (CALCIUM, MAGNESIUM, POTASSIUM, AND SODIUM OXYBATES) .5; .5; .5; .5 G/ML; G/ML; G/ML; G/ML
3 SOLUTION ORAL 2 TIMES DAILY
Qty: 360 ML | Refills: 5 | Status: SHIPPED | OUTPATIENT
Start: 2021-07-07 | End: 2021-12-21 | Stop reason: SDUPTHER

## 2021-07-21 DIAGNOSIS — G47.411 PRIMARY NARCOLEPSY WITH CATAPLEXY: ICD-10-CM

## 2021-07-21 NOTE — TELEPHONE ENCOUNTER
Received call from [address] requesting refill of patient's Nuvigil 150 MG tablet  Dr Pankaj Abdullahi, please review Rx and sign if appropriate

## 2021-07-22 RX ORDER — ARMODAFINIL 150 MG/1
150 TABLET ORAL DAILY
Qty: 30 TABLET | Refills: 3 | Status: SHIPPED | OUTPATIENT
Start: 2021-07-22 | End: 2021-12-20 | Stop reason: SDUPTHER

## 2021-07-27 ENCOUNTER — TELEPHONE (OUTPATIENT)
Dept: FAMILY MEDICINE CLINIC | Facility: CLINIC | Age: 26
End: 2021-07-27

## 2021-07-27 NOTE — TELEPHONE ENCOUNTER
Pt called to make an appt for Phy/TB test with dr Gavi Goldberg for mid august   Dr Sam Romero has a full schedule  Per [facility] ok to sched with either Dr Minerva De La O or Ronit afb  L/tere for pt to call back to schedule

## 2021-09-13 ENCOUNTER — TELEPHONE (OUTPATIENT)
Dept: SLEEP CENTER | Facility: CLINIC | Age: 26
End: 2021-09-13

## 2021-09-13 NOTE — TELEPHONE ENCOUNTER
Returned the patient's call  She was calling because the [facility] was closed temporarily  Advised her to recheck   I heard they reopened

## 2021-09-14 ENCOUNTER — TELEPHONE (OUTPATIENT)
Dept: SLEEP CENTER | Facility: CLINIC | Age: 26
End: 2021-09-14

## 2021-09-14 NOTE — TELEPHONE ENCOUNTER
Advised patient     [employer] and Matt Cobb primary       [facility] [phone]    Approval received from [facility] for brand Nuvigil     Coverage from 8/15/2021 to 9/14/2022    Notified the patient

## 2021-10-05 ENCOUNTER — TELEPHONE (OUTPATIENT)
Dept: FAMILY MEDICINE CLINIC | Facility: CLINIC | Age: 26
End: 2021-10-05

## 2021-10-05 DIAGNOSIS — H10.9 CONJUNCTIVITIS, UNSPECIFIED CONJUNCTIVITIS TYPE, UNSPECIFIED LATERALITY: Primary | ICD-10-CM

## 2021-10-05 RX ORDER — TOBRAMYCIN 3 MG/ML
1 SOLUTION/ DROPS OPHTHALMIC
Qty: 5 ML | Refills: 0 | Status: SHIPPED | OUTPATIENT
Start: 2021-10-05 | End: 2022-04-13

## 2021-11-27 ENCOUNTER — IMMUNIZATIONS (OUTPATIENT)
Dept: FAMILY MEDICINE CLINIC | Facility: HOSPITAL | Age: 26
End: 2021-11-27

## 2021-11-27 DIAGNOSIS — Z23 ENCOUNTER FOR IMMUNIZATION: Primary | ICD-10-CM

## 2021-11-27 PROCEDURE — 91300 COVID-19 PFIZER VACC 0.3 ML: CPT

## 2021-11-27 PROCEDURE — 0001A COVID-19 PFIZER VACC 0.3 ML: CPT

## 2021-12-20 DIAGNOSIS — G47.411 PRIMARY NARCOLEPSY WITH CATAPLEXY: ICD-10-CM

## 2021-12-21 DIAGNOSIS — G47.419 PRIMARY NARCOLEPSY WITHOUT CATAPLEXY: ICD-10-CM

## 2021-12-23 RX ORDER — ARMODAFINIL 150 MG/1
150 TABLET ORAL DAILY
Qty: 30 TABLET | Refills: 3 | Status: SHIPPED | OUTPATIENT
Start: 2021-12-23 | End: 2022-05-09 | Stop reason: SDUPTHER

## 2021-12-23 RX ORDER — (CALCIUM, MAGNESIUM, POTASSIUM, AND SODIUM OXYBATES) .5; .5; .5; .5 G/ML; G/ML; G/ML; G/ML
3 SOLUTION ORAL 2 TIMES DAILY
Qty: 360 ML | Refills: 5 | Status: SHIPPED | OUTPATIENT
Start: 2021-12-23 | End: 2022-06-01

## 2021-12-30 ENCOUNTER — TELEPHONE (OUTPATIENT)
Dept: OBGYN CLINIC | Facility: CLINIC | Age: 26
End: 2021-12-30

## 2021-12-30 DIAGNOSIS — Z30.41 ENCOUNTER FOR SURVEILLANCE OF CONTRACEPTIVE PILLS: ICD-10-CM

## 2021-12-30 RX ORDER — DESOG-E.ESTRADIOL/E.ESTRADIOL 21-5 (28)
1 TABLET ORAL DAILY
Qty: 84 TABLET | Refills: 0 | Status: SHIPPED | OUTPATIENT
Start: 2021-12-30 | End: 2022-04-01

## 2022-01-06 ENCOUNTER — TELEPHONE (OUTPATIENT)
Dept: OBGYN CLINIC | Facility: CLINIC | Age: 27
End: 2022-01-06

## 2022-01-06 NOTE — TELEPHONE ENCOUNTER
Spoke with patient, aware was sent at [facility] last week and pharmacist told her had to order  We did send a new rx for brand only  Pt aware will call pharmacy for patient  Called and could not speak with a pharmacist   Bailee Alvarez for pharmacist with full details that patient requests brand only of Serge Rustam as prescribed in last script  Please fill for patient 3 packs no refills and to call with questions or concerns

## 2022-01-18 ENCOUNTER — TELEPHONE (OUTPATIENT)
Dept: FAMILY MEDICINE CLINIC | Facility: CLINIC | Age: 27
End: 2022-01-18

## 2022-01-18 DIAGNOSIS — G47.419 PRIMARY NARCOLEPSY WITHOUT CATAPLEXY: Primary | ICD-10-CM

## 2022-01-18 NOTE — TELEPHONE ENCOUNTER
[facility] needs a new order placed in [facility]     Its a physician to physician referral   NOT a study   Diag code [address] [facility] [address] Romero [address]

## 2022-01-25 ENCOUNTER — OFFICE VISIT (OUTPATIENT)
Dept: SLEEP CENTER | Facility: CLINIC | Age: 27
End: 2022-01-25
Payer: COMMERCIAL

## 2022-01-25 VITALS
WEIGHT: 172 LBS | BODY MASS INDEX: 25.48 KG/M2 | DIASTOLIC BLOOD PRESSURE: 100 MMHG | HEIGHT: 69 IN | SYSTOLIC BLOOD PRESSURE: 144 MMHG

## 2022-01-25 DIAGNOSIS — G47.419 PRIMARY NARCOLEPSY WITHOUT CATAPLEXY: ICD-10-CM

## 2022-01-25 PROCEDURE — 99213 OFFICE O/P EST LOW 20 MIN: CPT | Performed by: INTERNAL MEDICINE

## 2022-01-25 PROCEDURE — 3008F BODY MASS INDEX DOCD: CPT | Performed by: INTERNAL MEDICINE

## 2022-01-25 NOTE — PROGRESS NOTES
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    Assessment:  The patient is doing well on Xywav 3 g twice nightly  The patient was changed from [facility] to [facility] due to hypertension  Her blood pressure is much better  She also takes Nuvigil 150 mg daily every morning    Plan:  Continue current regimen    Follow up: One year    History of Present Illness: The patient has narcolepsy without any recent episodes of cataplexy    Her MSLT showed an average sleep latency of 2 1 minutes with 3/5 naps demonstrating REM sleep    Review of Systems      Genitourinary none   Cardiology none   Gastrointestinal frequent heartburn/acid reflux   Neurology forgetfulness and poor concentration or confusion,    Constitutional claustrophobia   Integumentary none   Psychiatry none   Musculoskeletal none   Pulmonary snoring   ENT none   Endocrine none   Hematological none           I have reviewed and updated the review of systems as necessary     Historical Information    Past Medical History:   Diagnosis Date    Hearing impaired person, bilateral     Narcolepsy          Past Surgical History:   Procedure Laterality Date    TYMPANOSTOMY TUBE PLACEMENT      last assessed: 2017; remove chornic fluid    WISDOM TOOTH EXTRACTION           Social History     Socioeconomic History    Marital status: Single     Spouse name: Not on file    Number of children: Not on file    Years of education: Not on file    Highest education level: Not on file   Occupational History    Occupation: Student   Tobacco Use    Smoking status: Never Smoker    Smokeless tobacco: Never Used   Vaping Use    Vaping Use: Never used   Substance and Sexual Activity    Alcohol use: No    Drug use: No    Sexual activity: Yes     Partners: Male     Birth control/protection: OCP, Condom Male   Other Topics Concern    Not on file   Social History Narrative    Feels safe at home     Social Determinants of Health     Financial Resource Strain: Not on file   Food Insecurity: Not on file   Transportation Needs: Not on file   Physical Activity: Not on file   Stress: Not on file   Social Connections: Not on file   Intimate Partner Violence: Not on file   Housing Stability: Not on file           History   Alcohol use: Not on file       History   Smoking Status    Not on file   Smokeless Tobacco    Not on file       Family History:   Family History   Problem Relation Age of Onset    Diabetes type II Mother         without complications    Heart attack Mother     Hypertension Father         benign essential     Diabetes Maternal Grandmother     Ovarian cancer Other     Diabetes type II Other         without complications       Medications/Allergies:      Current Outpatient Medications:     cetirizine (ZyrTEC) 10 mg tablet, Take 10 mg by mouth daily, Disp: , Rfl:     Cholecalciferol (CVS VITAMIN D3) 1000 units capsule, Take by mouth, Disp: , Rfl:     Kariva 0 15-0 02/0 01 MG (21/5) per tablet, Take 1 tablet by mouth daily, Disp: 84 tablet, Rfl: 0    Nuvigil 150 MG tablet, Take 1 tablet (150 mg total) by mouth daily, Disp: 30 tablet, Rfl: 3    pantoprazole (PROTONIX) 40 mg tablet, TAKE 1 TABLET(40 MG) BY MOUTH DAILY, Disp: 90 tablet, Rfl: 3    Ca, Mg, K, and Na Oxybates (Xywav) 500 MG/ML SOLN, Take 3 g by mouth 2 (two) times a day First dose (bedtime):3 g + Second dose (2 5-4 hrs later): 3 g = 6 g Total Nightly Dose, Disp: 360 mL, Rfl: 5    methylPREDNISolone 4 MG tablet therapy pack, Use as directed on package, Disp: 21 each, Rfl: 0    tobramycin (Tobrex) 0 3 % SOLN, Administer 1 drop to both eyes every 4 (four) hours while awake, Disp: 5 mL, Rfl: 0      Objective    Vital Signs:   Vitals:    01/25/22 0801   BP: 144/100   Weight: 78 kg (172 lb)   Height: 5' 9" (1 753 m)     Victor Sleepiness Scale:  Total score: 5    Physical Exam:    General: Alert, appropriate, cooperative, normal build    Head: NC/AT    Skin: Warm, dry    Neuro: No motor abnormalities, cranial nerves appear intact    Psych: Normal affect            EDGAR Ortiz    Board Certified Sleep Specialist

## 2022-03-07 DIAGNOSIS — K21.9 GASTROESOPHAGEAL REFLUX DISEASE WITHOUT ESOPHAGITIS: ICD-10-CM

## 2022-03-09 DIAGNOSIS — K21.9 GASTROESOPHAGEAL REFLUX DISEASE WITHOUT ESOPHAGITIS: ICD-10-CM

## 2022-03-10 RX ORDER — PANTOPRAZOLE SODIUM 40 MG/1
TABLET, DELAYED RELEASE ORAL
Qty: 30 TABLET | Refills: 0 | Status: SHIPPED | OUTPATIENT
Start: 2022-03-10 | End: 2022-04-11

## 2022-03-11 RX ORDER — PANTOPRAZOLE SODIUM 40 MG/1
40 TABLET, DELAYED RELEASE ORAL DAILY
Qty: 90 TABLET | Refills: 3 | OUTPATIENT
Start: 2022-03-11

## 2022-03-31 DIAGNOSIS — Z30.41 ENCOUNTER FOR SURVEILLANCE OF CONTRACEPTIVE PILLS: ICD-10-CM

## 2022-04-01 DIAGNOSIS — Z30.41 ENCOUNTER FOR SURVEILLANCE OF CONTRACEPTIVE PILLS: ICD-10-CM

## 2022-04-01 RX ORDER — DESOG-E.ESTRADIOL/E.ESTRADIOL 21-5 (28)
1 TABLET ORAL DAILY
Qty: 84 TABLET | Refills: 0 | Status: SHIPPED | OUTPATIENT
Start: 2022-04-01 | End: 2022-04-13

## 2022-04-01 RX ORDER — DESOG-E.ESTRADIOL/E.ESTRADIOL 21-5 (28)
1 TABLET ORAL DAILY
Qty: 84 TABLET | Refills: 0 | Status: SHIPPED | OUTPATIENT
Start: 2022-04-01 | End: 2022-04-01

## 2022-04-10 DIAGNOSIS — K21.9 GASTROESOPHAGEAL REFLUX DISEASE WITHOUT ESOPHAGITIS: ICD-10-CM

## 2022-04-11 RX ORDER — PANTOPRAZOLE SODIUM 40 MG/1
TABLET, DELAYED RELEASE ORAL
Qty: 30 TABLET | Refills: 0 | Status: SHIPPED | OUTPATIENT
Start: 2022-04-11 | End: 2022-08-09 | Stop reason: SDUPTHER

## 2022-04-13 ENCOUNTER — OFFICE VISIT (OUTPATIENT)
Dept: OBGYN CLINIC | Facility: CLINIC | Age: 27
End: 2022-04-13
Payer: COMMERCIAL

## 2022-04-13 VITALS
BODY MASS INDEX: 24.88 KG/M2 | HEIGHT: 69 IN | WEIGHT: 168 LBS | DIASTOLIC BLOOD PRESSURE: 100 MMHG | SYSTOLIC BLOOD PRESSURE: 160 MMHG

## 2022-04-13 DIAGNOSIS — Z01.419 GYNECOLOGIC EXAM NORMAL: Primary | ICD-10-CM

## 2022-04-13 PROCEDURE — S0612 ANNUAL GYNECOLOGICAL EXAMINA: HCPCS | Performed by: PHYSICIAN ASSISTANT

## 2022-04-13 RX ORDER — DIAPER,BRIEF,INFANT-TODD,DISP
EACH MISCELLANEOUS
COMMUNITY
Start: 2022-03-02

## 2022-04-13 NOTE — PROGRESS NOTES
Assessment/Plan   Problem List Items Addressed This Visit        Other    Gynecologic exam normal - Primary     Pap guidelines reviewed  Pap with reflex done today  Reviewed concerns of elevated BP while being on combined OCP  Recommend considering discontinuing OCP or starting progesterone only pill  Patient would like to stop OCP and monitor menses  Will plan to use condoms for birth control  Return to office for annual or as needed  Subjective:     Patient ID: Annmarie Larson is a 23 y o  y o  female  HPI  22 yo seen for annual exam  Currently on Kariva OCP tolerating well would like to continue  Patient has had issues with elevated BP  Was thought to be white coat hypertension and then possibly from her Narcolepsy medications  BP today 160/100  Patient reports very anxious today secondary to leaving for [place] and fear of flying  Looking through chart last BP 1/25/2022 was 144/100  Patient denies chest pain, shortness of breath  Is considering stopping OCP secondary to effectiveness being decreased on her narcolepsy medications  Denies bowel or bladder issues  Last pap: 7/16/2019 NILM  The following portions of the patient's history were reviewed and updated as appropriate:   She  has a past medical history of Hearing impaired person, bilateral and Narcolepsy  She   Patient Active Problem List    Diagnosis Date Noted    Gynecologic exam normal 55/42/4720    Folliculitis of left axilla 07/16/2019    Encounter for gynecological examination without abnormal finding 04/17/2018    Acute sinusitis 09/22/2017    Narcoleptic syndrome 08/15/2017    Daytime somnolence 04/06/2017    Esophageal spasm 04/06/2017    Chlamydial infection of lower genitourinary tract 12/22/2016    Deficiency anemia 08/22/2016    Dysmenorrhea 10/16/2015     She  has a past surgical history that includes Tympanostomy tube placement and [place] tooth extraction    Her family history includes Diabetes in her maternal grandmother; Diabetes type II in her mother and other; Heart attack in her mother; Hypertension in her father; Ovarian cancer in her other  She  reports that she has never smoked  She has never used smokeless tobacco  She reports that she does not drink alcohol and does not use drugs  Current Outpatient Medications   Medication Sig Dispense Refill    Ca, Mg, K, and Na Oxybates (XYWAV PO) Take by mouth      cetirizine (ZyrTEC) 10 mg tablet Take 10 mg by mouth daily      Cholecalciferol (CVS VITAMIN D3) 1000 units capsule Take by mouth      hydrocortisone 1 % ointment APPLY TWICE A DAY X 2 WEEKS TO THE AFFECTED AREA ON FACE AND BEHIND EARS THEN STOP      pantoprazole (PROTONIX) 40 mg tablet TAKE 1 TABLET(40 MG) BY MOUTH DAILY 30 tablet 0    Ca, Mg, K, and Na Oxybates (Xywav) 500 MG/ML SOLN Take 3 g by mouth 2 (two) times a day First dose (bedtime):3 g + Second dose (2 5-4 hrs later): 3 g = 6 g Total Nightly Dose 360 mL 5    Nuvigil 150 MG tablet Take 1 tablet (150 mg total) by mouth daily 30 tablet 3     No current facility-administered medications for this visit  She is allergic to albuterol       Menstrual History:  OB History        0    Para   0    Term   0       0    AB   0    Living   0       SAB   0    IAB   0    Ectopic   0    Multiple   0    Live Births   0                  Patient's last menstrual period was 2022  Review of Systems   Constitutional: Negative for fatigue, fever and unexpected weight change  HENT: Negative for dental problem and sinus pressure  Eyes: Negative for visual disturbance  Respiratory: Negative for cough, shortness of breath and wheezing  Cardiovascular: Negative for chest pain  Gastrointestinal: Negative for abdominal pain, blood in stool, constipation, diarrhea, nausea and vomiting  Endocrine: Negative for polydipsia     Genitourinary: Negative for difficulty urinating, dyspareunia, dysuria, frequency, hematuria, pelvic pain and urgency  Musculoskeletal: Negative for arthralgias and back pain  Neurological: Negative for dizziness, seizures, light-headedness and headaches  Psychiatric/Behavioral: Negative for suicidal ideas  The patient is not nervous/anxious  Objective:  Vitals:    04/13/22 0725   BP: 160/100   BP Location: Left arm   Patient Position: Sitting   Cuff Size: Standard   Weight: 76 2 kg (168 lb)   Height: 5' 9" (1 753 m)      Physical Exam  Constitutional:       Appearance: Normal appearance  She is well-developed  Genitourinary:      Vulva and bladder normal       No lesions in the vagina  Right Labia: No rash, tenderness, lesions or skin changes  Left Labia: No tenderness, lesions, skin changes or rash  No labial fusion noted  No inguinal adenopathy present in the right or left side  No vaginal discharge, erythema, tenderness or bleeding  Right Adnexa: not tender, not full and no mass present  Left Adnexa: not tender, not full and no mass present  No cervical motion tenderness, discharge or lesion  Uterus is not enlarged, tender or irregular  No uterine mass detected  No urethral prolapse, tenderness or mass present  Bladder is not tender  Breasts: Breasts are symmetrical       Right: No swelling, bleeding, inverted nipple, mass, nipple discharge, skin change, tenderness, axillary adenopathy or supraclavicular adenopathy  Left: No swelling, bleeding, inverted nipple, mass, nipple discharge, skin change, tenderness, axillary adenopathy or supraclavicular adenopathy  HENT:      Head: Normocephalic and atraumatic  Neck:      Thyroid: No thyromegaly  Cardiovascular:      Rate and Rhythm: Normal rate and regular rhythm  Heart sounds: Normal heart sounds  No murmur heard  No friction rub  No gallop  Pulmonary:      Effort: Pulmonary effort is normal  No respiratory distress  Breath sounds: Normal breath sounds   No wheezing  Abdominal:      General: There is no distension  Palpations: Abdomen is soft  There is no mass  Tenderness: There is no abdominal tenderness  There is no guarding or rebound  Hernia: No hernia is present  Lymphadenopathy:      Cervical: No cervical adenopathy  Upper Body:      Right upper body: No supraclavicular, axillary or pectoral adenopathy  Left upper body: No supraclavicular, axillary or pectoral adenopathy  Lower Body: No right inguinal adenopathy  No left inguinal adenopathy  Neurological:      Mental Status: She is alert and oriented to person, place, and time  Skin:     General: Skin is warm and dry     Psychiatric:         Behavior: Behavior normal

## 2022-04-13 NOTE — ASSESSMENT & PLAN NOTE
Pap guidelines reviewed  Pap with reflex done today  Reviewed concerns of elevated BP while being on combined OCP  Recommend considering discontinuing OCP or starting progesterone only pill  Patient would like to stop OCP and monitor menses  Will plan to use condoms for birth control  Return to office for annual or as needed

## 2022-04-20 LAB
CYTOLOGIST CVX/VAG CYTO: NORMAL
DX ICD CODE: NORMAL
OTHER STN SPEC: NORMAL
OTHER STN SPEC: NORMAL
PATH REPORT.FINAL DX SPEC: NORMAL
SL AMB NOTE:: NORMAL
SL AMB SPECIMEN ADEQUACY: NORMAL
SL AMB TEST METHODOLOGY: NORMAL

## 2022-04-26 ENCOUNTER — OFFICE VISIT (OUTPATIENT)
Dept: FAMILY MEDICINE CLINIC | Facility: CLINIC | Age: 27
End: 2022-04-26
Payer: COMMERCIAL

## 2022-04-26 VITALS
HEIGHT: 69 IN | WEIGHT: 159 LBS | BODY MASS INDEX: 23.55 KG/M2 | OXYGEN SATURATION: 100 % | TEMPERATURE: 98.6 F | DIASTOLIC BLOOD PRESSURE: 84 MMHG | HEART RATE: 103 BPM | SYSTOLIC BLOOD PRESSURE: 120 MMHG

## 2022-04-26 DIAGNOSIS — Z00.00 ANNUAL PHYSICAL EXAM: Primary | ICD-10-CM

## 2022-04-26 PROCEDURE — 99395 PREV VISIT EST AGE 18-39: CPT | Performed by: FAMILY MEDICINE

## 2022-04-26 NOTE — PATIENT INSTRUCTIONS

## 2022-04-26 NOTE — PROGRESS NOTES
ADULT ANNUAL [address]    NAME: Harvey Gomez  AGE: 23 y o  SEX: female  : 1995     DATE: 2022     Assessment and Plan:     Problem List Items Addressed This Visit     None          Immunizations and preventive care screenings were discussed with patient today  Appropriate education was printed on patient's after visit summary  Counseling:  · Exercise: the importance of regular exercise/physical activity was discussed  Recommend exercise 3-5 times per week for at least 30 minutes  No follow-ups on file  Chief Complaint:     Chief Complaint   Patient presents with    Physical Exam      History of Present Illness:     Adult Annual Physical   Patient here for a comprehensive physical exam  The patient reports no problems  Diet and Physical Activity  · Diet/Nutrition: well balanced diet  · Exercise: moderate cardiovascular exercise  Depression Screening  PHQ-2/9 Depression Screening    Little interest or pleasure in doing things: 0 - not at all  Feeling down, depressed, or hopeless: 0 - not at all  PHQ-2 Score: 0  PHQ-2 Interpretation: Negative depression screen       General Health  · Sleep: sleeps well  · Hearing: normal - bilateral   · Vision: no vision problems  · Dental: regular dental visits  /GYN Health  · Last menstrual period: April   · Contraceptive method: barrier methods  · History of STDs?: no      Review of Systems:     Review of Systems   Constitutional: Negative for appetite change, chills and fever  HENT: Negative for ear pain, facial swelling, rhinorrhea, sinus pain, sore throat and trouble swallowing  Eyes: Negative for discharge and redness  Respiratory: Negative for chest tightness, shortness of breath and wheezing  Cardiovascular: Negative for chest pain and palpitations  Gastrointestinal: Negative for abdominal pain, diarrhea, nausea and vomiting     Endocrine: Negative for polyuria  Genitourinary: Negative for dysuria and urgency  Musculoskeletal: Negative for arthralgias and back pain  Skin: Negative for rash  Neurological: Negative for dizziness, weakness and headaches  Hematological: Negative for adenopathy  Psychiatric/Behavioral: Negative for behavioral problems, confusion and sleep disturbance  All other systems reviewed and are negative       Past Medical History:     Past Medical History:   Diagnosis Date    Hearing impaired person, bilateral     Narcolepsy       Past Surgical History:     Past Surgical History:   Procedure Laterality Date    TYMPANOSTOMY TUBE PLACEMENT      last assessed: 04/06/2017; remove chornic fluid    WISDOM TOOTH EXTRACTION        Social History:     Social History     Socioeconomic History    Marital status: Single     Spouse name: Not on file    Number of children: Not on file    Years of education: Not on file    Highest education level: Not on file   Occupational History    Occupation: Student   Tobacco Use    Smoking status: Never Smoker    Smokeless tobacco: Never Used   Vaping Use    Vaping Use: Never used   Substance and Sexual Activity    Alcohol use: No    Drug use: No    Sexual activity: Yes     Partners: Male     Birth control/protection: OCP, Condom Male   Other Topics Concern    Not on file   Social History Narrative    Feels safe at home     Social Determinants of Health     Financial Resource Strain: Not on file   Food Insecurity: Not on file   Transportation Needs: Not on file   Physical Activity: Not on file   Stress: Not on file   Social Connections: Not on file   Intimate Partner Violence: Not on file   Housing Stability: Not on file      Family History:     Family History   Problem Relation Age of Onset    Diabetes type II Mother         without complications    Heart attack Mother     Hypertension Father         benign essential     Diabetes Maternal Grandmother     Ovarian cancer Other     Diabetes type II Other         without complications      Current Medications:     Current Outpatient Medications   Medication Sig Dispense Refill    Ca, Mg, K, and Na Oxybates (XYWAV PO) Take by mouth      Ca, Mg, K, and Na Oxybates (Xywav) 500 MG/ML SOLN Take 3 g by mouth 2 (two) times a day First dose (bedtime):3 g + Second dose (2 5-4 hrs later): 3 g = 6 g Total Nightly Dose 360 mL 5    cetirizine (ZyrTEC) 10 mg tablet Take 10 mg by mouth daily      Cholecalciferol (CVS VITAMIN D3) 1000 units capsule Take by mouth      hydrocortisone 1 % ointment APPLY TWICE A DAY X 2 WEEKS TO THE AFFECTED AREA ON FACE AND BEHIND EARS THEN STOP      Nuvigil 150 MG tablet Take 1 tablet (150 mg total) by mouth daily 30 tablet 3    pantoprazole (PROTONIX) 40 mg tablet TAKE 1 TABLET(40 MG) BY MOUTH DAILY 30 tablet 0     No current facility-administered medications for this visit  Allergies: Allergies   Allergen Reactions    Albuterol Other (See Comments) and Rash     unknown      Physical Exam:     [facility] 04/05/2022     Physical Exam  Vitals and nursing note reviewed  Constitutional:       General: She is not in acute distress  Appearance: Normal appearance  She is not ill-appearing or diaphoretic  HENT:      Head: Normocephalic and atraumatic  Right Ear: Tympanic membrane, ear canal and external ear normal       Left Ear: Tympanic membrane, ear canal and external ear normal       Nose: Nose normal  No congestion or rhinorrhea  Mouth/Throat:      Mouth: Mucous membranes are moist       Pharynx: Oropharynx is clear  No posterior oropharyngeal erythema  Eyes:      General:         Right eye: No discharge  Left eye: No discharge  Extraocular Movements: Extraocular movements intact  Conjunctiva/sclera: Conjunctivae normal       Pupils: Pupils are equal, round, and reactive to light  Neck:      Vascular: No carotid bruit     Cardiovascular:      Rate and Rhythm: Normal rate and regular rhythm  Pulses: Normal pulses  Heart sounds: Normal heart sounds  No murmur heard  Pulmonary:      Effort: Pulmonary effort is normal  No respiratory distress  Breath sounds: Normal breath sounds  No wheezing or rhonchi  Abdominal:      General: Abdomen is flat  Bowel sounds are normal  There is no distension  Palpations: There is no mass  Tenderness: There is no abdominal tenderness  Musculoskeletal:         General: No swelling or deformity  Normal range of motion  Cervical back: Normal range of motion and neck supple  No rigidity  Right lower leg: No edema  Left lower leg: No edema  Lymphadenopathy:      Cervical: No cervical adenopathy  Skin:     General: Skin is warm and dry  Capillary Refill: Capillary refill takes less than 2 seconds  Coloration: Skin is not jaundiced  Findings: No bruising, erythema or rash  Neurological:      General: No focal deficit present  Mental Status: She is alert and oriented to person, place, and time  Cranial Nerves: No cranial nerve deficit  Sensory: No sensory deficit  Gait: Gait normal       Deep Tendon Reflexes: Reflexes normal    Psychiatric:         Mood and Affect: Mood normal          Behavior: Behavior normal          Thought Content:  Thought content normal          Judgment: Judgment normal           [address]

## 2022-05-09 DIAGNOSIS — G47.411 PRIMARY NARCOLEPSY WITH CATAPLEXY: ICD-10-CM

## 2022-05-09 NOTE — TELEPHONE ENCOUNTER
Received message from Vannessa Garcia at [facility] on [address]  States patient requires refill of Nuvigil 150 MG tablet       Dr Gaurav Rosenthal, please review Rx and sign if appropriate  Follow up scheduled 7/26/22

## 2022-05-11 RX ORDER — ARMODAFINIL 150 MG/1
150 TABLET ORAL DAILY
Qty: 30 TABLET | Refills: 3 | Status: SHIPPED | OUTPATIENT
Start: 2022-05-11 | End: 2022-07-26 | Stop reason: SDUPTHER

## 2022-06-01 DIAGNOSIS — G47.419 PRIMARY NARCOLEPSY WITHOUT CATAPLEXY: ICD-10-CM

## 2022-06-02 RX ORDER — (CALCIUM, MAGNESIUM, POTASSIUM, AND SODIUM OXYBATES) .5; .5; .5; .5 G/ML; G/ML; G/ML; G/ML
3 SOLUTION ORAL 2 TIMES DAILY
Qty: 360 ML | Refills: 5 | Status: SHIPPED | OUTPATIENT
Start: 2022-06-02 | End: 2022-07-26 | Stop reason: SDUPTHER

## 2022-06-08 ENCOUNTER — TELEPHONE (OUTPATIENT)
Dept: SLEEP CENTER | Facility: CLINIC | Age: 27
End: 2022-06-08

## 2022-06-08 NOTE — TELEPHONE ENCOUNTER
[facility]ywav  Started prior authorization in CoverMyMeds and received message:  Drug is covered by current benefit plan  No further PA activity needed

## 2022-06-08 NOTE — TELEPHONE ENCOUNTER
Called [facility] pharmacy and spoke to  [facility] ([place] regarding request for prior authorization for [facility]  Advised that per CoverMymeds, PA is not required  Starla ([place]) confirmed that medication is covered by [employer] and PA is not required  She ran the script and received a paid claim

## 2022-06-15 ENCOUNTER — TELEPHONE (OUTPATIENT)
Dept: SLEEP CENTER | Facility: CLINIC | Age: 27
End: 2022-06-15

## 2022-06-15 NOTE — TELEPHONE ENCOUNTER
Prior authorization for brand Nuvigil started in CoverMyMeds     Per CoverMy Meds   [facility] is reviewing your PA request and will respond within 24 hours for Medicaid or up to 72 hours for non-Medicaid plans, based on the required timeframe determined by state or federal regulations

## 2022-06-21 NOTE — TELEPHONE ENCOUNTER
Received denial from [facility] for brand Nuvigil  Per denial letter, coverage is provided if generic product would result in significant allergy or serious adverse reaction  Per chart note 3/13/2020:  She has been hypertensive, which she suspects may be result of her generic form of armodafinil  When she was on brand-name Nuvigil, she did not have this problem  I will order brand-name new visual see if there is any difference  Called [facility] to discuss determination [phone] spoke with Burnie Lennox  Per Burnie Lennox, appeal must be submitted specifically stating brand requested due to formulation differences in the inactive ingredients between brand and generic equivalent  Appeal completed and faxed to [phone]

## 2022-06-23 NOTE — TELEPHONE ENCOUNTER
Returned patient's call and left message  Advised insurance denied the prior authorization for name brand Nuvigil  Our office has submitted an appeal and are awaiting a determination  Advised nursing will reach out to her once we receive a determination  Provided nurse line phone number to call if any questions

## 2022-06-28 NOTE — TELEPHONE ENCOUNTER
Received fax from [address] [place], [place] [address] requesting additional information/documentation for prior authorization for Nuvigil  Completed form and documentation  faxed to [facility]

## 2022-07-05 ENCOUNTER — TELEPHONE (OUTPATIENT)
Dept: SLEEP CENTER | Facility: CLINIC | Age: 27
End: 2022-07-05

## 2022-07-05 NOTE — TELEPHONE ENCOUNTER
Received approval from [employer] for name brand Nuvigil  Approval valid 5/29/22 through 7/3/23  Kerry made aware

## 2022-07-26 ENCOUNTER — OFFICE VISIT (OUTPATIENT)
Dept: SLEEP CENTER | Facility: CLINIC | Age: 27
End: 2022-07-26
Payer: COMMERCIAL

## 2022-07-26 VITALS
BODY MASS INDEX: 24.91 KG/M2 | DIASTOLIC BLOOD PRESSURE: 98 MMHG | SYSTOLIC BLOOD PRESSURE: 168 MMHG | HEIGHT: 69 IN | WEIGHT: 168.2 LBS

## 2022-07-26 DIAGNOSIS — G47.411 PRIMARY NARCOLEPSY WITH CATAPLEXY: ICD-10-CM

## 2022-07-26 DIAGNOSIS — G47.419 PRIMARY NARCOLEPSY WITHOUT CATAPLEXY: ICD-10-CM

## 2022-07-26 PROCEDURE — 99213 OFFICE O/P EST LOW 20 MIN: CPT | Performed by: INTERNAL MEDICINE

## 2022-07-26 RX ORDER — (CALCIUM, MAGNESIUM, POTASSIUM, AND SODIUM OXYBATES) .5; .5; .5; .5 G/ML; G/ML; G/ML; G/ML
3 SOLUTION ORAL 2 TIMES DAILY
Qty: 360 ML | Refills: 5 | Status: SHIPPED | OUTPATIENT
Start: 2022-07-26 | End: 2022-08-25

## 2022-07-26 RX ORDER — ARMODAFINIL 150 MG/1
150 TABLET ORAL DAILY
Qty: 30 TABLET | Refills: 3 | Status: SHIPPED | OUTPATIENT
Start: 2022-07-26 | End: 2022-08-25

## 2022-07-26 NOTE — PROGRESS NOTES
Progress Note - [facility] Center   Helen Current :1995 MRN: [id number]      Reason for Visit:    33 y  o female with narcolepsy with cataplexy    Assessment:  Doing well on Xywav, Nuvigil    Plan:  Continue same    Follow up:  6 mos    History of Present Illness:  Narcolepsy with cataplexy    Review of Systems      Genitourinary none   Cardiology none   Gastrointestinal frequent heartburn/acid reflux   Neurology forgetfulness   Constitutional claustrophobia   Integumentary none   Psychiatry none   Musculoskeletal none   Pulmonary none   ENT none   Endocrine none   Hematological none           I have reviewed and updated the review of systems as necessary     Historical Information    Past Medical History:   Diagnosis Date    Hearing impaired person, bilateral     Narcolepsy          Past Surgical History:   Procedure Laterality Date    TYMPANOSTOMY TUBE PLACEMENT      last assessed: 2017; remove chornic fluid    WISDOM TOOTH EXTRACTION           Social History     Socioeconomic History    Marital status: Single     Spouse name: None    Number of children: None    Years of education: None    Highest education level: None   Occupational History    Occupation: Student   Tobacco Use    Smoking status: Never Smoker    Smokeless tobacco: Never Used   Vaping Use    Vaping Use: Never used   Substance and Sexual Activity    Alcohol use: No    Drug use: No    Sexual activity: Yes     Partners: Male     Birth control/protection: OCP, Condom Male   Other Topics Concern    None   Social History Narrative    Feels safe at home     Social Determinants of Health     Financial Resource Strain: Not on file   Food Insecurity: Not on file   Transportation Needs: Not on file   Physical Activity: Not on file   Stress: Not on file   Social Connections: Not on file   Intimate Partner Violence: Not on file   Housing Stability: Not on file           History   Alcohol use: Not on file       History   Smoking Status  Not on file   Smokeless Tobacco    Not on file       Family History:   Family History   Problem Relation Age of Onset    Diabetes type II Mother         without complications    Heart attack Mother     Hypertension Father         benign essential     Diabetes Maternal Grandmother     Ovarian cancer Other     Diabetes type II Other         without complications       Medications/Allergies:      Current Outpatient Medications:     Ca, Mg, K, and Na Oxybates (Xywav) 500 MG/ML SOLN, Take 3 g by mouth 2 (two) times a day First dose (bedtime):3 g + Second dose (2 5-4 hrs later): 3 g = 6 g Total Nightly Dose, Disp: 360 mL, Rfl: 5    cetirizine (ZyrTEC) 10 mg tablet, Take 10 mg by mouth daily, Disp: , Rfl:     Cholecalciferol 25 MCG (1000 UT) capsule, Take by mouth, Disp: , Rfl:     Nuvigil 150 MG tablet, Take 1 tablet (150 mg total) by mouth daily, Disp: 30 tablet, Rfl: 3    pantoprazole (PROTONIX) 40 mg tablet, TAKE 1 TABLET(40 MG) BY MOUTH DAILY, Disp: 30 tablet, Rfl: 0    hydrocortisone 1 % ointment, APPLY TWICE A DAY X 2 WEEKS TO THE AFFECTED AREA ON FACE AND BEHIND EARS THEN STOP (Patient not taking: No sig reported), Disp: , Rfl:       Objective    Vital Signs:   Vitals:    07/26/22 1317   BP: 168/98   Weight: 76 3 kg (168 lb 3 2 oz)   Height: 5' 9" (1 753 m)     Cascade Sleepiness Scale: Total score: 11    Physical Exam:    General: Alert, appropriate, cooperative, normal build    Head: NC/AT    Skin: Warm, dry    Neuro: No motor abnormalities, cranial nerves appear intact    Psych: Normal affect            EDGAR Alegria    Board Certified Sleep Specialist

## 2022-08-09 DIAGNOSIS — K21.9 GASTROESOPHAGEAL REFLUX DISEASE WITHOUT ESOPHAGITIS: ICD-10-CM

## 2022-08-09 RX ORDER — PANTOPRAZOLE SODIUM 40 MG/1
40 TABLET, DELAYED RELEASE ORAL DAILY
Qty: 90 TABLET | Refills: 0 | Status: SHIPPED | OUTPATIENT
Start: 2022-08-09

## 2022-09-16 ENCOUNTER — TELEPHONE (OUTPATIENT)
Dept: SLEEP CENTER | Facility: CLINIC | Age: 27
End: 2022-09-16

## 2022-09-16 NOTE — TELEPHONE ENCOUNTER
Received auto-generated fax regarding generic substitution request for Nuvigil-150mg tablets  Ramon [phone], spoke with Joe Landa  Advised that patient has a prior authorization for brand Nuvigil with approval valid 5/29/22 through 7/3/2023  [employer] representative Joe Landa reports updating patient's chart to reflect approval for brand Nuvigil

## 2022-09-19 DIAGNOSIS — L71.9 ROSACEA: Primary | ICD-10-CM

## 2022-09-19 RX ORDER — METRONIDAZOLE 7.5 MG/G
GEL TOPICAL 2 TIMES DAILY
Qty: 45 G | Refills: 3 | Status: SHIPPED | OUTPATIENT
Start: 2022-09-19

## 2022-11-15 DIAGNOSIS — K21.9 GASTROESOPHAGEAL REFLUX DISEASE WITHOUT ESOPHAGITIS: ICD-10-CM

## 2022-11-15 RX ORDER — PANTOPRAZOLE SODIUM 40 MG/1
TABLET, DELAYED RELEASE ORAL
Qty: 90 TABLET | Refills: 0 | Status: SHIPPED | OUTPATIENT
Start: 2022-11-15

## 2022-12-04 ENCOUNTER — OFFICE VISIT (OUTPATIENT)
Dept: URGENT CARE | Age: 27
End: 2022-12-04

## 2022-12-04 ENCOUNTER — APPOINTMENT (OUTPATIENT)
Dept: RADIOLOGY | Age: 27
End: 2022-12-04

## 2022-12-04 VITALS
RESPIRATION RATE: 12 BRPM | SYSTOLIC BLOOD PRESSURE: 136 MMHG | OXYGEN SATURATION: 99 % | HEART RATE: 83 BPM | TEMPERATURE: 98.9 F | DIASTOLIC BLOOD PRESSURE: 88 MMHG

## 2022-12-04 DIAGNOSIS — M25.512 ACUTE PAIN OF LEFT SHOULDER: ICD-10-CM

## 2022-12-04 DIAGNOSIS — M25.512 ACUTE PAIN OF LEFT SHOULDER: Primary | ICD-10-CM

## 2022-12-04 NOTE — PROGRESS NOTES
[facility]        NAME: Javier Varma is a 33 y o  female  : 1995    MRN: [id number]  DATE: 2022  TIME: 1:02 PM    Assessment and Plan   Acute pain of left shoulder [M25 512]  1  Acute pain of left shoulder  XR shoulder 2+ vw left            Patient Instructions     Alternate ice and moist heat  Take ibuprofen as directed  Follow up with PT  Follow up with PCP in 3-5 days  Proceed to  ER if symptoms worsen  Chief Complaint     Chief Complaint   Patient presents with   • Shoulder Pain     Sharp left shoulder pain since          History of Present Illness       Shoulder Pain   The pain is present in the left shoulder  This is a new problem  The current episode started in the past 7 days  History of extremity trauma: Unsure  The problem occurs constantly  The problem has been unchanged  The quality of the pain is described as aching and burning  The pain is at a severity of 5/10  The pain is moderate  Pertinent negatives include no fever, inability to bear weight, limited range of motion, numbness, stiffness or tingling  She has tried acetaminophen for the symptoms  The treatment provided mild relief  Review of Systems   Review of Systems   Constitutional: Negative for fever  Musculoskeletal: Negative for stiffness  Neurological: Negative for tingling and numbness           Current Medications       Current Outpatient Medications:   •  Ca, Mg, K, and Na Oxybates (Xywav) 500 MG/ML SOLN, Take 3 g by mouth 2 (two) times a day First dose (bedtime):3 g + Second dose (2 5-4 hrs later): 3 g = 6 g Total Nightly Dose, Disp: 360 mL, Rfl: 5  •  cetirizine (ZyrTEC) 10 mg tablet, Take 10 mg by mouth daily, Disp: , Rfl:   •  Cholecalciferol 25 MCG (1000 UT) capsule, Take by mouth, Disp: , Rfl:   •  hydrocortisone 1 % ointment, APPLY TWICE A DAY X 2 WEEKS TO THE AFFECTED AREA ON FACE AND BEHIND EARS THEN STOP (Patient not taking: No sig reported), Disp: , Rfl:   •  metroNIDAZOLE (METROGEL) 0 75 % gel, Apply topically 2 (two) times a day, Disp: 45 g, Rfl: 3  •  Nuvigil 150 MG tablet, Take 1 tablet (150 mg total) by mouth daily, Disp: 30 tablet, Rfl: 3  •  pantoprazole (PROTONIX) 40 mg tablet, TAKE 1 TABLET(40 MG) BY MOUTH DAILY, Disp: 90 tablet, Rfl: 0    Current Allergies     Allergies as of 12/04/2022 - Reviewed 12/04/2022   Allergen Reaction Noted   • Albuterol Other (See Comments) and Rash 03/16/2015            The following portions of the patient's history were reviewed and updated as appropriate: allergies, current medications, past family history, past medical history, past social history, past surgical history and problem list      Past Medical History:   Diagnosis Date   • Hearing impaired person, bilateral    • Narcolepsy        Past Surgical History:   Procedure Laterality Date   • TYMPANOSTOMY TUBE PLACEMENT      last assessed: 04/06/2017; remove chornic fluid   • WISDOM TOOTH EXTRACTION         Family History   Problem Relation Age of Onset   • Diabetes type II Mother         without complications   • Heart attack Mother    • Hypertension Father         benign essential    • Diabetes Maternal Grandmother    • Ovarian cancer Other    • Diabetes type II Other         without complications         Medications have been verified  Objective   /88 (BP Location: Left arm, Patient Position: Sitting, Cuff Size: Large)   Pulse 83   Temp 98 9 °F (37 2 °C) (Temporal)   Resp 12   SpO2 99%   No LMP recorded  Physical Exam     Physical Exam  Vitals and nursing note reviewed  Constitutional:       Appearance: Normal appearance  HENT:      Head: Normocephalic and atraumatic  Cardiovascular:      Rate and Rhythm: Normal rate  Pulmonary:      Effort: Pulmonary effort is normal  No respiratory distress  Musculoskeletal:      Left upper arm: Tenderness (AC joint ) present  No swelling, edema, deformity or lacerations  Comments: Negative drop arm  +5 strength  Skin:     Capillary Refill: Capillary refill takes less than 2 seconds  Neurological:      Mental Status: She is alert

## 2022-12-04 NOTE — PATIENT INSTRUCTIONS
Rotator Cuff Injury   WHAT YOU NEED TO KNOW:   A rotator cuff injury is damage to the muscles or tendons of your rotator cuff  The rotator cuff is a group of muscles and tendons that hold the shoulder joint in place  The damage may include muscle stretching, tendon tears, or bursa inflammation  The bursa is a fluid sac around the joint  DISCHARGE INSTRUCTIONS:   Call your doctor or orthopedist if:   You suddenly cannot move your arm  The pain in your shoulder or arm is not improving, or is worse than before you started treatment  You have new pain in your neck  You have questions or concerns about your condition or care  Medicines: You may need any of the following:  Acetaminophen  decreases pain and fever  It is available without a doctor's order  Ask how much to take and how often to take it  Follow directions  Read the labels of all other medicines you are using to see if they also contain acetaminophen, or ask your doctor or pharmacist  Acetaminophen can cause liver damage if not taken correctly  Do not use more than 4 grams (4,000 milligrams) total of acetaminophen in one day  NSAIDs  help decrease swelling and pain or fever  This medicine is available with or without a doctor's order  NSAIDs can cause stomach bleeding or kidney problems in certain people  If you take blood thinner medicine, always ask your healthcare provider if NSAIDs are safe for you  Always read the medicine label and follow directions  Prescription pain medicine  may be given  Ask your healthcare provider how to take this medicine safely  Some prescription pain medicines contain acetaminophen  Do not take other medicines that contain acetaminophen without talking to your healthcare provider  Too much acetaminophen may cause liver damage  Prescription pain medicine may cause constipation  Ask your healthcare provider how to prevent or treat constipation  Take your medicine as directed    Contact your healthcare provider if you think your medicine is not helping or if you have side effects  Tell him or her if you are allergic to any medicine  Keep a list of the medicines, vitamins, and herbs you take  Include the amounts, and when and why you take them  Bring the list or the pill bottles to follow-up visits  Carry your medicine list with you in case of an emergency  A physical therapist  can teach you exercises to help improve shoulder movement and strength, and decrease pain  You may learn changes to daily activities that will help decrease stress on your tendons  Self-care:   Rest  may help your shoulder heal  Overuse of your shoulder can make your injury worse  Avoid heavy lifting, putting your arms over your head, or sports that need an overhead or throwing motion  Any of these movements can cause or worsen a rotator cuff injury  Put ice on your shoulder  every few hours for the first several days  Ice helps decrease pain and swelling  Use an ice pack, or put crushed ice in a plastic bag  Wrap a towel around the bag before you put it on your shoulder  Apply ice for 15 minutes every hour, or as directed  Put heat on your shoulder  when directed  After the first several days, heat may help relax the muscles in your shoulder  Use a heat pack or heating pad  Apply heat for 20 minutes every hour, or as directed  Follow up with your doctor or orthopedist as directed:  Write down your questions so you remember to ask them during your visits  © Copyright [employer] 2022 Information is for End User's use only and may not be sold, redistributed or otherwise used for commercial purposes  All illustrations and images included in CareNotes® are the copyrighted property of A D A M , Inc  or [employer]ley [employer]  The above information is an  only  It is not intended as medical advice for individual conditions or treatments   Talk to your doctor, nurse or pharmacist before following any medical regimen to see if it is safe and effective for you

## 2022-12-20 DIAGNOSIS — G47.411 PRIMARY NARCOLEPSY WITH CATAPLEXY: ICD-10-CM

## 2022-12-20 NOTE — TELEPHONE ENCOUNTER
Patient left message on the nurse line requesting refill Rx for Nuvigil 150mg tablets  Last office visit 7/26/2022  Next office visit 1/31/2023  Dr Shay Washington, please review and sign if appropriate  Thank you

## 2022-12-22 RX ORDER — ARMODAFINIL 150 MG/1
150 TABLET ORAL DAILY
Qty: 30 TABLET | Refills: 3 | Status: SHIPPED | OUTPATIENT
Start: 2022-12-22 | End: 2023-01-21

## 2022-12-29 DIAGNOSIS — G47.419 PRIMARY NARCOLEPSY WITHOUT CATAPLEXY: ICD-10-CM

## 2022-12-29 NOTE — TELEPHONE ENCOUNTER
Received fax from [address] requesting refill Rx for Ca, Mg, K, and Na Oxybates (Xywav) 500 MG/ML SOLN    Last office visit 7/26/2022  Next office visit 1/31/2023  Dr Elisabet Holt, please review and sign if appropriate  Thank you

## 2022-12-30 RX ORDER — (CALCIUM, MAGNESIUM, POTASSIUM, AND SODIUM OXYBATES) .5; .5; .5; .5 G/ML; G/ML; G/ML; G/ML
3 SOLUTION ORAL 2 TIMES DAILY
Qty: 360 ML | Refills: 5 | Status: SHIPPED | OUTPATIENT
Start: 2022-12-30 | End: 2023-01-29

## 2023-01-03 ENCOUNTER — EVALUATION (OUTPATIENT)
Dept: PHYSICAL THERAPY | Facility: CLINIC | Age: 28
End: 2023-01-03

## 2023-01-03 DIAGNOSIS — M25.512 ACUTE PAIN OF LEFT SHOULDER: Primary | ICD-10-CM

## 2023-01-03 NOTE — PROGRESS NOTES
PT Evaluation     Today's date: 1/3/2023  Patient name: Joanne Hilario  : 1995  MRN: [id number]  Referring provider: Aurelio Milligan PA-C  Dx:   Encounter Diagnosis     ICD-10-CM    1  Acute pain of left shoulder  M25 512 Ambulatory Referral to Physical Therapy                     Assessment  Assessment details: Patient presents with signs and symptoms consistent with referring diagnosis  A significant level of her pain was nearly resolved with cervical movements today, so there is a cervical component to her presentation  Mechanical Assessment revealed a posterior derangement with a treatment principle of cervical extension  She also has ER weakness and a (+) ERLS, but ERLS was resolved post tx, though ER weakness remained Positive prognostic indicators include:  Improving, improved with treatment today, motivated to improve  Negative prognostic indicators include: (-) She presents with: pain, decreased: ROM, strength and  functional capacity  She requires skilled PT to address these deficits and restore maximal functional capacity  Thank you for this pleasant referral        Impairments: abnormal or restricted ROM, activity intolerance, impaired physical strength, lacks appropriate home exercise program and pain with function  Understanding of Dx/Px/POC: good   Prognosis: good    Goals  ST-6 weeks  1  Patient to be independent with HEP  2   Decrease pain at least 2 subjective levels  LT-12 weeks  1    Patient to voice comfort with self management of condition  2   75% or > decreased pain  3   75% or > decreased functional deficits  5   Normalize strength  7   Patient to voice understanding of activities/positions to avoid        Plan  Patient would benefit from: skilled PT  Referral necessary: No  Planned modality interventions: cryotherapy  Planned therapy interventions: IADL retraining, joint mobilization, manual therapy, motor coordination training, neuromuscular re-education, patient education, postural training, self care, strengthening, stretching, therapeutic activities, therapeutic exercise, home exercise program, flexibility, ADL training, balance and body mechanics training  Frequency: 2x week  Duration in weeks: 6  Treatment plan discussed with: patient        Subjective Evaluation    History of Present Illness  Onset date: 1 month ago  Mechanism of injury: Chief Complaint:  L Shoulder pain    History:  Patient notes insidious onset of L shoulder pain beginning about a month ago  She had pain as well as paresthesias initially but paresthesias have gone away  She had an x-ray of her shoulder   She denies prior treatment   Symptoms are improving  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          PMH: (-)     Aggravating factors:  Reaching, Lifting overhead    Relieving factors: Rest    Functional Deficits: prolonged or heavy physical activity with shoulder    Patient Goals:  "don't want sharp pain" "don't want to feel like a weight is keeping me from lifting"    Quality of life: good    Pain  At best pain ratin  At worst pain ratin  Location: Anterior shoulder           Objective     Active Range of Motion   Left Shoulder   Flexion: WFL  Abduction: WFL  External rotation 45°: WFL  External rotation BTH: WFL    Passive Range of Motion   Left Shoulder   Flexion: WFL  Abduction: WFL  External rotation 45°: WFL  Internal rotation 45°: WFL    Strength/Myotome Testing     Left Shoulder     Planes of Motion   Flexion: 4+   Extension: 4+   External rotation at 0°: 3+   Internal rotation at 45°: 4+     General Comments:      Shoulder Comments   (+) ERLS  (+) Crystal Huffman  (-) Remainder special testing L shoulder    Cx Assessment:  Repeated retraction/extension nearly resolved pain with active shoulder horiz adduction and reaching into abduction             Precautions: (-)      Manuals             Cx Ret/Ext/Dist C            Multiplanar GH Mobs Neuro Re-Ed                                                                                                        Ther Ex             HEP 10            Cx Ret Ext seated             Prone Ret/Ext             Supine Ret/Ext/Towel             Shoulder RM Asst             Scap Retraction                                       Ther Activity                                       Gait Training                                       Modalities

## 2023-01-05 ENCOUNTER — OFFICE VISIT (OUTPATIENT)
Dept: PHYSICAL THERAPY | Facility: CLINIC | Age: 28
End: 2023-01-05

## 2023-01-05 DIAGNOSIS — M25.512 ACUTE PAIN OF LEFT SHOULDER: Primary | ICD-10-CM

## 2023-01-05 NOTE — PROGRESS NOTES
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  Objective: See treatment diary below      Assessment: Tolerated treatment well  Patient would benefit from continued PT      Plan: Continue per plan of care        Precautions: (-)      Manuals 1/3 1/5           Cx Ret/Ext/Dist C C           Multiplanar GH Mobs                                       Neuro Re-Ed                                                                                                        Ther Ex             HEP 10            Cx Ret Ext seated  40x           Prone Ret/Ext  40x           Supine Ret/Ext/Towel  20x           Shoulder RM Asst             Scap Retraction             SL ER  1# 20           [place] ER  R1 :03 20           Ther Activity                                       Gait Training                                       Modalities

## 2023-01-10 ENCOUNTER — OFFICE VISIT (OUTPATIENT)
Dept: PHYSICAL THERAPY | Facility: CLINIC | Age: 28
End: 2023-01-10

## 2023-01-10 DIAGNOSIS — M25.512 ACUTE PAIN OF LEFT SHOULDER: Primary | ICD-10-CM

## 2023-01-10 NOTE — PROGRESS NOTES
Daily Note     Today's date: 1/10/2023  Patient name: Joanne Hilario  : 1995  MRN: [id number]  Referring provider: Aurelio Milligan PA-C  Dx:   Encounter Diagnosis     ICD-10-CM    1  Acute pain of left shoulder  M25 512                      Subjective: Remains about the same      Objective: See treatment diary below  Baseline symptoms improved significantly or resolved post tx  ER strength unchanged  Assessment: Tolerated treatment well  Patient responded to extension/L SB for Cx spine and shoulder extension  Appears to have both cervical and shoudler derangements      Plan: Continue per plan of care        Precautions: (-)      Manuals 1/3 1/5 1/10          Cx Ret/Ext/Dist C C C          Multiplanar GH Mobs                                       Neuro Re-Ed                                                                                                        Ther Ex             HEP 10            Cx Ret Ext seated  40x 30          Prone Ret/Ext  40x 20x          Supine Ret/Ext/Towel  20x           Shoulder RM Asst   C          Scap Retraction             SL ER  1# 20 1# 20          [place] ER  R1 :03 20 R1 1 :03 20          Cx Ret/Ext pt OP   20x          ER Gurdeep   :03 10x          Shoulder Ext AROM   20x          Shoulder Ext Wand AAROM   20x          L SB pt OP   :03 20 x 2                       Ther Activity                                       Gait Training                                       Modalities

## 2023-01-13 ENCOUNTER — APPOINTMENT (OUTPATIENT)
Dept: PHYSICAL THERAPY | Facility: CLINIC | Age: 28
End: 2023-01-13

## 2023-01-17 ENCOUNTER — APPOINTMENT (OUTPATIENT)
Dept: PHYSICAL THERAPY | Facility: CLINIC | Age: 28
End: 2023-01-17

## 2023-01-19 ENCOUNTER — OFFICE VISIT (OUTPATIENT)
Dept: PHYSICAL THERAPY | Facility: CLINIC | Age: 28
End: 2023-01-19

## 2023-01-19 DIAGNOSIS — M25.512 ACUTE PAIN OF LEFT SHOULDER: Primary | ICD-10-CM

## 2023-01-19 NOTE — PROGRESS NOTES
Daily Note     Today's date: 2023  Patient name: Beatrice Bowman  : 1995  MRN: [id number]  Referring provider: Esther Bui PA-C  Dx:   Encounter Diagnosis     ICD-10-CM    1  Acute pain of left shoulder  M25 512                      Subjective: Symptoms remain "about the same"      Objective: See treatment diary below  No change in ER strength  Ed in ER Strength exercises for HEP  Assessment: Tolerated treatment fair  Patient continues to have ER weakness unchanged since IE      Plan: Continue per plan of care        Precautions: (-)      Manuals 1/3 1/5 1/10 1/19         Cx Ret/Ext/Dist C C C C         Multiplanar GH Mobs                                       Neuro Re-Ed                                                                                                        Ther Ex             HEP 10            Cx Ret Ext seated  40x 30 30         Prone Ret/Ext  40x 20x 20         Supine Ret/Ext/Towel  20x  R 1:03 20         Shoulder RM Asst   C          Scap Retraction             SL ER  1# 20 1# 20 1# 20         [place] ER  R1 :03 20 R1 1 :03 20 R 1 :03 20         Cx Ret/Ext pt OP   20x 20x         ER Gurdeep   :03 10x :03 10x         Shoulder Ext AROM   20x 20x         Shoulder Ext Wand AAROM   20x 20x         L SB pt OP   :03 20 x 2 :03 20x         Prone Row/ER    0# 20         Ther Activity                                       Gait Training                                       Modalities

## 2023-01-23 ENCOUNTER — APPOINTMENT (OUTPATIENT)
Dept: PHYSICAL THERAPY | Facility: CLINIC | Age: 28
End: 2023-01-23

## 2023-01-23 ENCOUNTER — TELEPHONE (OUTPATIENT)
Dept: FAMILY MEDICINE CLINIC | Facility: CLINIC | Age: 28
End: 2023-01-23

## 2023-01-23 DIAGNOSIS — G47.419 NARCOLEPSY WITHOUT CATAPLEXY(347.00): Primary | ICD-10-CM

## 2023-01-23 NOTE — TELEPHONE ENCOUNTER
Jacques Burnett called stating she needs a new order placed for pt to come in for an appt  It is a doctor to doctor order, not a study  Pt's appt is on 1/31    Dx code is G53 80

## 2023-01-26 ENCOUNTER — EVALUATION (OUTPATIENT)
Dept: PHYSICAL THERAPY | Facility: CLINIC | Age: 28
End: 2023-01-26

## 2023-01-26 DIAGNOSIS — M25.512 ACUTE PAIN OF LEFT SHOULDER: Primary | ICD-10-CM

## 2023-01-26 DIAGNOSIS — K21.9 GASTROESOPHAGEAL REFLUX DISEASE WITHOUT ESOPHAGITIS: ICD-10-CM

## 2023-01-26 RX ORDER — PANTOPRAZOLE SODIUM 40 MG/1
TABLET, DELAYED RELEASE ORAL
Qty: 90 TABLET | Refills: 0 | Status: SHIPPED | OUTPATIENT
Start: 2023-01-26

## 2023-01-26 NOTE — PROGRESS NOTES
Daily Note     Today's date: 2023  Patient name: Lacey Aguilar  : 1995  MRN: [id number]  Referring provider: Moi Teresa PA-C  Dx:   Encounter Diagnosis     ICD-10-CM    1  Acute pain of left shoulder  M25 512                      Subjective: Pain is variable, feels "some" improvement in ER strength      Objective: See treatment diary below      Assessment: Tolerated treatment well  Patient would benefit from continued PT      Plan: Continue per plan of care   RE NV     Precautions: (-)      Manuals 1/3 1/5 1/10 1/19 1/26        Cx Ret/Ext/Dist C C C C C        Multiplanar GH Mobs                                       Neuro Re-Ed                                                                                                        Ther Ex             HEP 10            Cx Ret Ext seated  40x 30 30 30        Prone Ret/Ext  40x 20x 20 20         Supine Ret/Ext/Towel  20x  R 1:03 20         Shoulder RM Asst   C          Scap Retraction             SL ER  1# 20 1# 20 1# 20 1# 20        [place] ER  R1 :03 20 R1 1 :03 20 R 1 :03 20 R 1 :03 20        Cx Ret/Ext pt OP   20x 20x 20x        ER Gurdeep   :03 10x :03 10x         Shoulder Ext AROM   20x 20x         Shoulder Ext Wand AAROM   20x 20x :03 20        L SB pt OP   :03 20 x 2 :03 20x :03 20        Prone Row/ER    0# 20 0# 20        Ther Activity                                       Gait Training                                       Modalities

## 2023-01-30 ENCOUNTER — EVALUATION (OUTPATIENT)
Dept: PHYSICAL THERAPY | Facility: CLINIC | Age: 28
End: 2023-01-30

## 2023-01-30 DIAGNOSIS — M25.512 ACUTE PAIN OF LEFT SHOULDER: Primary | ICD-10-CM

## 2023-01-30 NOTE — PROGRESS NOTES
PT ReEvaluation    Today's date: 2023  Patient name: Beatrice Bowman  : 1995  MRN: [id number]  Referring provider: Esther Bui PA-C  Dx:   Encounter Diagnosis     ICD-10-CM    1  Acute pain of left shoulder  M25 512                      Assessment  Assessment details: Patient has been compliant with attending PT and home exercise program since initial eval   She  has made progress towards her goals and improvements in objective data since initial eval but is still limited compared to prior level of function  She continues with to have above listed impairments and would benefit from additional skilled PT to address these deficits to return to maximal level of function  Her pain and strength have both improved, but she remains limtied with higher level activities involving OH use of shoulder  Thank you for this pleasant referral       Impairments: activity intolerance, impaired physical strength, lacks appropriate home exercise program and pain with function  Understanding of Dx/Px/POC: good   Prognosis: good    Goals  ST-6 weeks  1  Patient to be independent with HEP - Met  2  Decrease pain at least 2 subjective levels  - Partially Met     LT-12 weeks  1    Patient to voice comfort with self management of condition - Partially Met  2   75% or > decreased pain  - Partially Met  3   75% or > decreased functional deficits  - Partially Met  5    Normalize strength -Partially Met      Plan  Patient would benefit from: skilled PT  Referral necessary: No  Planned modality interventions: cryotherapy  Planned therapy interventions: IADL retraining, joint mobilization, manual therapy, motor coordination training, neuromuscular re-education, patient education, postural training, self care, strengthening, stretching, therapeutic activities, therapeutic exercise, home exercise program, flexibility, ADL training, balance and body mechanics training  Frequency: 2x week  Duration in weeks: 6  Treatment plan discussed with: patient        Subjective Evaluation    History of Present Illness  Onset date: 1 month ago  Mechanism of injury: Patient overall feels ~25% improvements since beginning therapy  She notes 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  She notes soreness with OH ADLs like curling her hair  She also notes most pain with quick/unexpected movements such as trying to stop a child misbehaving          Patient Goals:  "don't want sharp pain" "don't want to feel like a weight is keeping me from lifting"    Quality of life: good    Pain  At best pain ratin  At worst pain ratin  Location: Anterior shoulder   Quality: radiating          Objective     Active Range of Motion   Left Shoulder   Flexion: WFL  Abduction: WFL  External rotation 45°: WFL  External rotation BTH: WFL    Passive Range of Motion   Left Shoulder   Flexion: WFL  Abduction: WFL  External rotation 45°: WFL  Internal rotation 45°: WFL    Strength/Myotome Testing     Left Shoulder     Planes of Motion   Flexion: 4+   Extension: 4+   External rotation at 0°: 4   Internal rotation at 45°: 4+     General Comments:      Shoulder Comments   (-) ERLS  (+) Jt Cypher  (-) Remainder special testing L shoulder    Cx Assessment:  Repeated retraction/extension nearly resolved pain with active shoulder horiz adduction and reaching into abduction  Symptoms mod reduced with cervical end range extension exercises             Precautions: (-)    Manuals 1/3 1/5 1/10 1/19 1/26 1/30       Cx Ret/Ext/Dist C C C C C C       Multiplanar  Mobs                                       Neuro Re-Ed                                                                                                        Ther Ex             HEP 10            Cx Ret Ext seated  40x 30 30 30 30       Prone Ret/Ext  40x 20x 20 20  20       Supine Ret/Ext/Towel  20x  R 1:03 20         Shoulder RM Asst   C          Scap Retraction             SL ER  1# 20 1# 20 1# 20 1# 20 1# 20       [place] ER  R1 :03 20 R1 1 :03 20 R 1 :03 20 R 1 :03 20 R 1 :03 20       Cx Ret/Ext pt OP   20x 20x 20x 20x       ER Gurdeep   :03 10x :03 10x         Shoulder Ext AROM   20x 20x         Shoulder Ext Wand AAROM   20x 20x :03 20 :03 20       L SB pt OP   :03 20 x 2 :03 20x :03 20        Prone Row/ER    0# 20 0# 20 1# 20       Prone OH lift      1# 20       Standing Scaption      1# 20       TB ER 90/90      Y 20       [place] Row/Shld Ext      R 5 3 :03 20       Ther Activity                                       Gait Training                                       Modalities

## 2023-02-02 ENCOUNTER — EVALUATION (OUTPATIENT)
Dept: PHYSICAL THERAPY | Facility: CLINIC | Age: 28
End: 2023-02-02

## 2023-02-02 DIAGNOSIS — M25.512 ACUTE PAIN OF LEFT SHOULDER: Primary | ICD-10-CM

## 2023-02-02 NOTE — PROGRESS NOTES
Daily Note     Today's date: 2023  Patient name: Jonh Gonsalez  : 1995  MRN: [id number]  Referring provider: Bryan Foreman PA-C  Dx:   Encounter Diagnosis     ICD-10-CM    1  Acute pain of left shoulder  M25 512                      Subjective:  Pt feels about the same   Objective: See treatment diary below  (+) Impingement to start tx      Assessment: Tolerated treatment well  Patient challenged by ER strengthening exercises      Plan: Continue per plan of care        Precautions: (-)    Manuals 1/3 1/5 1/10 1/19 1/26 1/30 2/2      Cx Ret/Ext/Dist C C C C C C C      Multiplanar GH Mobs                                       Neuro Re-Ed                                                                                                        Ther Ex             HEP 10            Cx Ret Ext seated  40x 30 30 30 30 30      Prone Ret/Ext  40x 20x 20 20  20 20      Supine Ret/Ext/Towel  20x  R 1:03 20         Shoulder RM Asst   C          Scap Retraction             SL ER  1# 20 1# 20 1# 20 1# 20 1# 20 1# 20      [place] ER  R1 :03 20 R1 1 :03 20 R 1 :03 20 R 1 :03 20 R 1 :03 20 R 2 :03 20      Cx Ret/Ext pt OP   20x 20x 20x 20x 20x      ER Gurdeep   :03 10x :03 10x         Shoulder Ext AROM   20x 20x         Shoulder Ext Wand AAROM   20x 20x :03 20 :03 20 overlooked      L SB pt OP   :03 20 x 2 :03 20x :03 20        Prone Row/ER    0# 20 0# 20 1# 20 1# 20      Prone OH lift      1# 20 1# 20      Standing Scaption      1# 20 1# 20      TB ER 90/90      Y 20 Y 20      [place] Row/Shld Ext      R 5 3 :03 20 R 5 :03 20      Ther Activity                                       Gait Training                                       Modalities

## 2023-02-07 ENCOUNTER — OFFICE VISIT (OUTPATIENT)
Dept: PHYSICAL THERAPY | Facility: CLINIC | Age: 28
End: 2023-02-07

## 2023-02-07 DIAGNOSIS — M25.512 ACUTE PAIN OF LEFT SHOULDER: Primary | ICD-10-CM

## 2023-02-07 NOTE — PROGRESS NOTES
Daily Note     Today's date: 2023  Patient name: Kevin Henao  : 1995  MRN: [id number]  Referring provider: Peri Fernandez PA-C  Dx:   Encounter Diagnosis     ICD-10-CM    1  Acute pain of left shoulder  M25 512                      Subjective: No pain to start; symptoms mostly unchanged      Objective: See treatment diary below  (+) Leora to start tx    Assessment: Tolerated treatment well  Patient had decreased impingement post tx today      Plan: Continue per plan of care        Precautions: (-)    Manuals 1/3 1/5 1/10 1/19 1/26 1/30 2/2 2/7     Cx Ret/Ext/Dist C C C C C C C C     Multiplanar GH Mobs                                       Neuro Re-Ed                                                                                                        Ther Ex             HEP 10            Cx Ret Ext seated  40x 30 30 30 30 30 30x     Prone Ret/Ext  40x 20x 20 20  20 20 20x     Supine Ret/Ext/Towel  20x  R 1:03 20         Shoulder RM Asst   C          Scap Retraction             SL ER  1# 20 1# 20 1# 20 1# 20 1# 20 1# 20 1# 20     [place] ER  R1 :03 20 R1 1 :03 20 R 1 :03 20 R 1 :03 20 R 1 :03 20 R 2 :03 20 R2 :03 20     Cx Ret/Ext pt OP   20x 20x 20x 20x 20x 20x     ER Gurdeep   :03 10x :03 10x         Shoulder Ext AROM   20x 20x         Shoulder Ext Wand AAROM   20x 20x :03 20 :03 20 overlooked :03 20     L SB pt OP   :03 20 x 2 :03 20x :03 20        Prone Row/ER    0# 20 0# 20 1# 20 1# 20 1# 20     Prone OH lift      1# 20 1# 20 1# 20     Standing Scaption      1# 20 1# 20 1# 20     TB ER 90/90      Y 20 Y 20 Y 20     [place] Row/Shld Ext      R 5 3 :03 20 R 5 :03 20 R 5 :03 20 ea     Ther Activity                                       Gait Training                                       Modalities

## 2023-02-09 ENCOUNTER — OFFICE VISIT (OUTPATIENT)
Dept: PHYSICAL THERAPY | Facility: CLINIC | Age: 28
End: 2023-02-09

## 2023-02-09 DIAGNOSIS — M25.512 ACUTE PAIN OF LEFT SHOULDER: Primary | ICD-10-CM

## 2023-02-09 NOTE — PROGRESS NOTES
Daily Note     Today's date: 2023  Patient name: Beatrice Bowman  : 1995  MRN: [id number]  Referring provider: Esther Bui PA-C  Dx:   Encounter Diagnosis     ICD-10-CM    1  Acute pain of left shoulder  M25 512                      Subjective:  Doing well      Objective: See treatment diary below      Assessment: Tolerated treatment well  Patient continues to improve ER strength      Plan: Continue per plan of care        Precautions: (-)    Manuals 1/3 1/5 1/10 1/19 1/26 1/30 2/2 2/7 2/9    Cx Ret/Ext/Dist C C C C C C C C C    Multiplanar GH Mobs                                       Neuro Re-Ed                                                                                                        Ther Ex             HEP 10            Cx Ret Ext seated  40x 30 30 30 30 30 30x 30x    Prone Ret/Ext  40x 20x 20 20  20 20 20x 20x    Supine Ret/Ext/Towel  20x  R 1:03 20         Shoulder RM Asst   C          Scap Retraction             SL ER  1# 20 1# 20 1# 20 1# 20 1# 20 1# 20 1# 20 1# 20    [place] ER  R1 :03 20 R1 1 :03 20 R 1 :03 20 R 1 :03 20 R 1 :03 20 R 2 :03 20 R2 :03 20 R 2 :03 20    Cx Ret/Ext pt OP   20x 20x 20x 20x 20x 20x 20    ER Gurdeep   :03 10x :03 10x         Shoulder Ext AROM   20x 20x         Shoulder Ext Wand AAROM   20x 20x :03 20 :03 20 overlooked :03 20 :03 20    L SB pt OP   :03 20 x 2 :03 20x :03 20        Prone Row/ER    0# 20 0# 20 1# 20 1# 20 1# 20 1# 20    OH Hold to Fatigue         1# 5x    Prone OH lift      1# 20 1# 20 1# 20 1# 20    Standing Scaption      1# 20 1# 20 1# 20 1# 20    TB ER 90/90      Y 20 Y 20 Y 20 Y 20    [place] Row/Shld Ext      R 5 3 :03 20 R 5 :03 20 R 5 :03 20 ea     Ther Activity                                       Gait Training                                       Modalities

## 2023-02-13 ENCOUNTER — OFFICE VISIT (OUTPATIENT)
Dept: PHYSICAL THERAPY | Facility: CLINIC | Age: 28
End: 2023-02-13

## 2023-02-13 DIAGNOSIS — M25.512 ACUTE PAIN OF LEFT SHOULDER: Primary | ICD-10-CM

## 2023-02-13 NOTE — PROGRESS NOTES
Daily Note     Today's date: 2023  Patient name: Philip Leigh  : 1995  MRN: [id number]  Referring provider: Veronica Dia PA-C  Dx:   Encounter Diagnosis     ICD-10-CM    1  Acute pain of left shoulder  M25 512                      Subjective: Pt noted shoulder pain after lifting a bag for her mother      Objective: See treatment diary below  (+) [place] Impingement to start tx  ER MMT       Assessment: Tolerated treatment well  Patient has improved ER strength since IE      Plan: Continue per plan of care        Precautions: (-)    Manuals 1/3 1/5 1/10 1/19 1/26 1/30 2/2 2/7 2/9 2/13   Cx Ret/Ext/Dist C C C C C C C C C C   Multiplanar GH Mobs                                       Neuro Re-Ed                                                                                                        Ther Ex             HEP 10            Cx Ret Ext seated  40x 30 30 30 30 30 30x 30x 30x   Prone Ret/Ext  40x 20x 20 20  20 20 20x 20x 20x   Supine Ret/Ext/Towel  20x  R 1:03 20         Shoulder RM Asst   C          Scap Retraction             SL ER  1# 20 1# 20 1# 20 1# 20 1# 20 1# 20 1# 20 1# 20 1# 20   [place] ER  R1 :03 20 R1 1 :03 20 R 1 :03 20 R 1 :03 20 R 1 :03 20 R 2 :03 20 R2 :03 20 R 2 :03 20 R 20 :03 20   Cx Ret/Ext pt OP   20x 20x 20x 20x 20x 20x 20 20x   ER Gurdeep   :03 10x :03 10x         Shoulder Ext AROM   20x 20x         Shoulder Ext Wand AAROM   20x 20x :03 20 :03 20 overlooked :03 20 :03 20 :03 20   L SB pt OP   :03 20 x 2 :03 20x :03 20        Prone Row/ER    0# 20 0# 20 1# 20 1# 20 1# 20 1# 20 1# 10; 2# 10   OH Hold to Fatigue         1# 5x 1# 5x   Prone OH lift      1# 20 1# 20 1# 20 1# 20 1# 10; 2# 10   Standing Scaption      1# 20 1# 20 1# 20 1# 20 1# 10; 2# 10   TB ER 90/90      Y 20 Y 20 Y 20 Y 20 Y 20   [place] Row/Shld Ext      R 5 3 :03 20 R 5 :03 20 R 5 :03 20 ea  R 5 :03 20 ea   Ther Activity                                       Gait Training Modalities

## 2023-02-17 ENCOUNTER — EVALUATION (OUTPATIENT)
Dept: PHYSICAL THERAPY | Facility: CLINIC | Age: 28
End: 2023-02-17

## 2023-02-17 DIAGNOSIS — M25.512 ACUTE PAIN OF LEFT SHOULDER: Primary | ICD-10-CM

## 2023-02-17 NOTE — PROGRESS NOTES
PT ReEvaluation    Today's date: 2023  Patient name: Lacey Aguilar  : 1995  MRN: [id number]  Referring provider: Moi Teresa PA-C  Dx:   Encounter Diagnosis     ICD-10-CM    1  Acute pain of left shoulder  M25 512                      Assessment  Assessment details: Patient has been compliant with attending PT and home exercise program since initial eval   She  has made progress towards her goals and improvements in objective data since initial eval but is still limited compared to prior level of function  She continues with to have above listed impairments and would benefit from additional skilled PT to address these deficits to return to maximal level of function  Her pain and strength have both improved, but less improvement is noted in testing today than at time of last RE, though she has been able to progress weight with functional strengthening exercises over the past few weeks  Will recheck in 2 more weeks and consider ortho referral if not making further progress  Thank you for this pleasant referral       Impairments: activity intolerance, impaired physical strength, lacks appropriate home exercise program and pain with function  Understanding of Dx/Px/POC: good   Prognosis: good    Goals  ST-6 weeks  1  Patient to be independent with HEP - Met  2  Decrease pain at least 2 subjective levels  - Partially Met     LT-12 weeks  1    Patient to voice comfort with self management of condition - Partially Met  2   75% or > decreased pain  - Partially Met  3   75% or > decreased functional deficits  - Partially Met  5    Normalize strength -Partially Met      Plan  Patient would benefit from: skilled PT  Referral necessary: No  Planned modality interventions: cryotherapy  Planned therapy interventions: IADL retraining, joint mobilization, manual therapy, motor coordination training, neuromuscular re-education, patient education, postural training, self care, strengthening, stretching, therapeutic activities, therapeutic exercise, home exercise program, flexibility, ADL training, balance and body mechanics training  Frequency: 2x week  Duration in weeks: 6  Treatment plan discussed with: patient        Subjective Evaluation    History of Present Illness  Onset date: 1 month ago  Mechanism of injury: Patient continues to have some pain and notes overhead weakness  She notes this remains mostly unchanged since onset        Patient Goals:  "don't want sharp pain" "don't want to feel like a weight is keeping me from lifting"    Quality of life: good    Pain  At best pain ratin  At worst pain ratin  Location: Anterior shoulder   Quality: radiating          Objective     Active Range of Motion   Left Shoulder   Flexion: WFL  Abduction: WFL  External rotation 45°: WFL  External rotation BTH: WFL    Passive Range of Motion   Left Shoulder   Flexion: WFL  Abduction: WFL  External rotation 45°: WFL  Internal rotation 45°: WFL    Strength/Myotome Testing     Left Shoulder     Planes of Motion   Flexion: 4+   Extension: 4+   External rotation at 0°: 4   Internal rotation at 45°: 4+     General Comments:      Shoulder Comments   (-) ERLS  (+) Chiki Mcguire  (-) Remainder special testing L shoulder    Cx Assessment:  Repeated retraction/extension nearly resolved pain with active shoulder horiz adduction and reaching into abduction  Symptoms mod reduced with cervical end range extension exercises             Precautions: (-)  Manuals 2/17 1/5 1/10 1/19 1/26 1/30 2/2 2/7 2/9 2/13   Cx Ret/Ext/Dist C C C C C C C C C C   Multiplanar  Mobs                                       Neuro Re-Ed                                                                                                        Ther Ex             HEP             Cx Ret Ext seated 30x 40x 30 30 30 30 30 30x 30x 30x   Prone Ret/Ext 20x 40x 20x 20 20  20 20 20x 20x 20x   Supine Ret/Ext/Towel  20x  R 1:03 20         Shoulder RM Asst   C          Scap Retraction             SL ER 2# 20 1# 20 1# 20 1# 20 1# 20 1# 20 1# 20 1# 20 1# 20 1# 20   [place] ER R 2 :03 20 R1 :03 20 R1 1 :03 20 R 1 :03 20 R 1 :03 20 R 1 :03 20 R 2 :03 20 R2 :03 20 R 2 :03 20 R 20 :03 20   Cx Ret/Ext pt OP 20x  20x 20x 20x 20x 20x 20x 20 20x   ER Gurdeep   :03 10x :03 10x         Shoulder Ext AROM   20x 20x         Shoulder Ext Wand AAROM :03 20  20x 20x :03 20 :03 20 overlooked :03 20 :03 20 :03 20   L SB pt OP   :03 20 x 2 :03 20x :03 20        Prone Row/ER 1# 10  2# 10   0# 20 0# 20 1# 20 1# 20 1# 20 1# 20 1# 10; 2# 10   OH Hold to Fatigue 2# 5x        1# 5x 1# 5x   Prone OH lift 1# 10 2# 10     1# 20 1# 20 1# 20 1# 20 1# 10; 2# 10   Standing Scaption 1# 10 2# 10     1# 20 1# 20 1# 20 1# 20 1# 10; 2# 10   TB ER 90/90 R 20     Y 20 Y 20 Y 20 Y 20 Y 20   [place] Row/Shld Ext R6 :03 20     R 5 3 :03 20 R 5 :03 20 R 5 :03 20 ea  R 5 :03 20 ea   Ther Activity                                       Gait Training                                       Modalities

## 2023-02-20 ENCOUNTER — OFFICE VISIT (OUTPATIENT)
Dept: PHYSICAL THERAPY | Facility: CLINIC | Age: 28
End: 2023-02-20

## 2023-02-20 DIAGNOSIS — M25.512 ACUTE PAIN OF LEFT SHOULDER: Primary | ICD-10-CM

## 2023-02-20 NOTE — PROGRESS NOTES
Daily Note     Today's date: 2023  Patient name: Terrie Staley  : 1995  MRN: [id number]  Referring provider: Gage Wilson PA-C  Dx:   Encounter Diagnosis     ICD-10-CM    1  Acute pain of left shoulder  M25 512                      Subjective: Pt remains "about the same"      Objective: See treatment diary below      Assessment: Tolerated treatment well  Patient would benefit from continued PT      Plan: Continue per plan of care        Precautions: (-)  Manuals 2/17 2/20 1/10 1/19 1/26 1/30 2/2 2/7 2/9 2/13   Cx Ret/Ext/Dist C C C C C C C C C C   Multiplanar GH Mobs                                       Neuro Re-Ed                                                                                                        Ther Ex             HEP             Cx Ret Ext seated 30x 40x 30 30 30 30 30 30x 30x 30x   Prone Ret/Ext 20x 20x 20x 20 20  20 20 20x 20x 20x   Supine Ret/Ext/Towel  20x  R 1:03 20         Shoulder RM Asst   C          Scap Retraction             SL ER 2# 20 2# 20 1# 20 1# 20 1# 20 1# 20 1# 20 1# 20 1# 20 1# 20   [place] ER R 2 :03 20 R1 :03 20 R1 1 :03 20 R 1 :03 20 R 1 :03 20 R 1 :03 20 R 2 :03 20 R2 :03 20 R 2 :03 20 R 20 :03 20   Cx Ret/Ext pt OP 20x 20x 20x 20x 20x 20x 20x 20x 20 20x   ER Gurdeep   :03 10x :03 10x         Shoulder Ext AROM   20x 20x         Shoulder Ext Wand AAROM :03 20 :03 20 20x 20x :03 20 :03 20 overlooked :03 20 :03 20 :03 20   L SB pt OP   :03 20 x 2 :03 20x :03 20        Prone Row/ER 1# 10  2# 10 2# 20  0# 20 0# 20 1# 20 1# 20 1# 20 1# 20 1# 10; 2# 10   OH Hold to Fatigue 2# 5x 2# 5x       1# 5x 1# 5x   Prone OH lift 1# 10 2# 10 2# 20    1# 20 1# 20 1# 20 1# 20 1# 10; 2# 10   Standing Scaption 1# 10 2# 10 2# 20    1# 20 1# 20 1# 20 1# 20 1# 10; 2# 10   TB ER 90/90 R 20 R 20    Y 20 Y 20 Y 20 Y 20 Y 20   [place] Shrestha/Francie Ext R6 :03 20 R 6 5 :03 20    R 5 3 :03 20 R 5 :03 20 R 5 :03 20 ea  R 5 :03 20 ea   UBE  2'/2'           Ther Activity Gait Training                                       Modalities

## 2023-02-24 ENCOUNTER — OFFICE VISIT (OUTPATIENT)
Dept: PHYSICAL THERAPY | Facility: CLINIC | Age: 28
End: 2023-02-24

## 2023-02-24 DIAGNOSIS — M25.512 ACUTE PAIN OF LEFT SHOULDER: Primary | ICD-10-CM

## 2023-02-24 NOTE — PROGRESS NOTES
Daily Note     Today's date: 2023  Patient name: Jennifer Becker  : 1995  MRN: [id number]  Referring provider: Kady Gan PA-C  Dx:   Encounter Diagnosis     ICD-10-CM    1  Acute pain of left shoulder  M25 512                      Subjective: Nothing new to report      Objective: See treatment diary below      Assessment: Tolerated treatment well   Patient continues to have ER weakness      Plan: RE NV     Precautions: (-)  Manuals    Cx Ret/Ext/Dist C C C C C C C C C C   Multiplanar GH Mobs                                       Neuro Re-Ed                                                                                                        Ther Ex             HEP             Cx Ret Ext seated 30x 40x 30 30 30 30 30 30x 30x 30x   Prone Ret/Ext 20x 20x 20x 20 20  20 20 20x 20x 20x   Supine Ret/Ext/Towel  20x  R 1:03 20         Shoulder RM Asst   C          Scap Retraction             SL ER 2# 20 2# 20 2# 20 1# 20 1# 20 1# 20 1# 20 1# 20 1# 20 1# 20   [place] ER R 2 :03 20 R1 :03 20 R1 1 :03 20 R 1 :03 20 R 1 :03 20 R 1 :03 20 R 2 :03 20 R2 :03 20 R 2 :03 20 R 20 :03 20   Cx Ret/Ext pt OP 20x 20x 20x 20x 20x 20x 20x 20x 20 20x   ER Gurdeep    :03 10x         Shoulder Ext AROM    20x         Shoulder Ext Wand AAROM :03 20 :03 20 20x 20x :03 20 :03 20 overlooked :03 20 :03 20 :03 20   L SB pt OP    :03 20x :03 20        Prone Row/ER 1# 10  2# 10 2# 20 2# 20 0# 20 0# 20 1# 20 1# 20 1# 20 1# 20 1# 10; 2# 10   OH Hold to Fatigue 2# 5x 2# 5x 2# 5x      1# 5x 1# 5x   Prone OH lift 1# 10 2# 10 2# 20 2# 20   1# 20 1# 20 1# 20 1# 20 1# 10; 2# 10   Standing Scaption 1# 10 2# 10 2# 20 2# 20   1# 20 1# 20 1# 20 1# 20 1# 10; 2# 10   TB ER 90/90 R 20 R 20 R 20   Y 20 Y 20 Y 20 Y 20 Y 20   [place] Row/Shld Ext R6 :03 20 R 6 5 :03 20 R 6 5 :03 20   R 5 3 :03 20 R 5 :03 20 R 5 :03 20 ea  R 5 :03 20 ea   UBE  2'/2' 3'/3'          Ther Activity Gait Training                                       Modalities

## 2023-02-27 ENCOUNTER — APPOINTMENT (OUTPATIENT)
Dept: PHYSICAL THERAPY | Facility: CLINIC | Age: 28
End: 2023-02-27

## 2023-03-03 ENCOUNTER — OFFICE VISIT (OUTPATIENT)
Dept: PHYSICAL THERAPY | Facility: CLINIC | Age: 28
End: 2023-03-03

## 2023-03-03 DIAGNOSIS — M25.512 ACUTE PAIN OF LEFT SHOULDER: Primary | ICD-10-CM

## 2023-03-03 NOTE — PROGRESS NOTES
Daily Note     Today's date: 3/3/2023  Patient name: Kevin Henao  : 1995  MRN: [id number]  Referring provider: Peri Fernandez PA-C  Dx:   Encounter Diagnosis     ICD-10-CM    1  Acute pain of left shoulder  M25 512                      Subjective: Patient offers no new complaints to begin session  Objective: See treatment diary below      Assessment: Tolerated treatment well  Patient continues to be challenged with current resistances  Patient demonstrated fatigue post treatment, exhibited good technique with therapeutic exercises and would benefit from continued PT  Continue as able         Plan: RE NV     Precautions: (-)  Manuals 2/17 2/20 2/24 3/3  1/30 2/2 2/7 2/9 2/13   Cx Ret/Ext/Dist C C C LH  C C C C C   Multiplanar GH Mobs                                       Neuro Re-Ed                                                                                                        Ther Ex    3/3         HEP             Cx Ret Ext seated 30x 40x 30   30 30 30x 30x 30x   Prone Ret/Ext 20x 20x 20x   20 20 20x 20x 20x   Supine Ret/Ext/Towel  20x           Shoulder RM Asst   C          Scap Retraction             SL ER 2# 20 2# 20 2# 20   1# 20 1# 20 1# 20 1# 20 1# 20   [place] ER R 2 :03 20 R1 :03 20 R1 1 :03 20 1 3 20x3"  R 1 :03 20 R 2 :03 20 R2 :03 20 R 2 :03 20 R 20 :03 20   Cx Ret/Ext pt OP 20x 20x 20x   20x 20x 20x 20 20x   ER Gurdeep             Shoulder Ext AROM             Shoulder Ext Wand AAROM :03 20 :03 20 20x 20x  :03 20 overlooked :03 20 :03 20 :03 20   L SB pt OP             Prone Row/ER 1# 10  2# 10 2# 20 2# 20 2# 20x  1# 20 1# 20 1# 20 1# 20 1# 10; 2# 10   OH Hold to Fatigue 2# 5x 2# 5x 2# 5x 2# 5x     1# 5x 1# 5x   Prone OH lift 1# 10 2# 10 2# 20 2# 20 2# 20x  1# 20 1# 20 1# 20 1# 20 1# 10; 2# 10   Standing Scaption 1# 10 2# 10 2# 20 2# 20 2# 20x  1# 20 1# 20 1# 20 1# 20 1# 10; 2# 10   TB ER 90/90 R 20 R 20 R 20 RTB 20x  Y 20 Y 20 Y 20 Y 20 Y 20   [place] Shrestha/Shld Ext R6 :03 20 R 6 5 :03 20 R 6 5 :03 20 6 5, 20x3" L only  R 5 3 :03 20 R 5 :03 20 R 5 :03 20 ea  R 5 :03 20 ea   UBE  2'/2' 3'/3' 3'/3'         Ther Activity                                       Gait Training                                       Modalities

## 2023-03-07 ENCOUNTER — EVALUATION (OUTPATIENT)
Dept: PHYSICAL THERAPY | Facility: CLINIC | Age: 28
End: 2023-03-07

## 2023-03-07 DIAGNOSIS — M25.512 ACUTE PAIN OF LEFT SHOULDER: Primary | ICD-10-CM

## 2023-03-07 NOTE — PROGRESS NOTES
Daily Note     Today's date: 3/7/2023  Patient name: Sudhakar Kincaid  : 1995  MRN: [id number]  Referring provider: Julio César Munoz PA-C  Dx:   Encounter Diagnosis     ICD-10-CM    1  Acute pain of left shoulder  M25 512                      Subjective: Pt feels "about the same" over the past few weeks  She notes working overhead or lifting/carrying remain painful/difficult  Objective: See treatment diary below  Cx AROM WFL  Shoulder AROM WFL except ER 45 at 0 degrees abduction  Shoulder ER MMT 3+/5  (+) ERLS- Mild  (+) Impingement      Assessment: Pt has had some improvement in pain but has concerning L Shoulder ER weakness that has not responded to strengthening  Concern re: significant RTC involvement  Encouraged to see ortho specialist, provided contact info for Dr Yang Amezquita: Hold further PT at this time        Precautions: (-)  Manuals 2/17 2/20 2/24 3/3 3/7 1/30 2/2 2/7 2/9 2/13   Cx Ret/Ext/Dist C C C LH  C C C C C   Multiplanar GH Mobs                          Recheck     C        Neuro Re-Ed                                                                                                        Ther Ex    3/3         HEP             Cx Ret Ext seated 30x 40x 30  C 30 30 30x 30x 30x   Prone Ret/Ext 20x 20x 20x   20 20 20x 20x 20x   Supine Ret/Ext/Towel  20x           Shoulder RM Asst   C          Scap Retraction             SL ER 2# 20 2# 20 2# 20   1# 20 1# 20 1# 20 1# 20 1# 20   [place] ER R 2 :03 20 R1 :03 20 R1 1 :03 20 1 3 20x3"  R 1 :03 20 R 2 :03 20 R2 :03 20 R 2 :03 20 R 20 :03 20   Cx Ret/Ext pt OP 20x 20x 20x  C 20x 20x 20x 20 20x   ER Gurdeep             Shoulder Ext AROM             Shoulder Ext Wand AAROM :03 20 :03 20 20x 20x  :03 20 overlooked :03 20 :03 20 :03 20   L SB pt OP             Prone Row/ER 1# 10  2# 10 2# 20 2# 20 2# 20x  1# 20 1# 20 1# 20 1# 20 1# 10; 2# 10   OH Hold to Fatigue 2# 5x 2# 5x 2# 5x 2# 5x     1# 5x 1# 5x   Prone OH lift 1# 10 2# 10 2# 20 2# 20 2# 20x  1# 20 1# 20 1# 20 1# 20 1# 10; 2# 10   Standing Scaption 1# 10 2# 10 2# 20 2# 20 2# 20x  1# 20 1# 20 1# 20 1# 20 1# 10; 2# 10   TB ER 90/90 R 20 R 20 R 20 RTB 20x  Y 20 Y 20 Y 20 Y 20 Y 20   [place] Row/Shld Ext R6 :03 20 R 6 5 :03 20 R 6 5 :03 20 6 5, 20x3" L only  R 5 3 :03 20 R 5 :03 20 R 5 :03 20 ea  R 5 :03 20 ea   UBE  2'/2' 3'/3' 3'/3'         Ther Activity                                       Gait Training                                       Modalities

## 2023-04-03 DIAGNOSIS — G47.411 PRIMARY NARCOLEPSY WITH CATAPLEXY: ICD-10-CM

## 2023-04-03 RX ORDER — ARMODAFINIL 150 MG/1
150 TABLET ORAL DAILY
Qty: 30 TABLET | Refills: 3 | Status: SHIPPED | OUTPATIENT
Start: 2023-04-03 | End: 2023-04-05 | Stop reason: SDUPTHER

## 2023-04-03 NOTE — TELEPHONE ENCOUNTER
Patient left message on the nurse line requesting a refill Rx for Nuvigil-150mg tablets  Patient reports the medication is backordered and she has been without the medication for quite some time  She is asking for a refill at this time, but would like Dr Mariella San to consider an alternative medication  Explained to patient that many medications, especially stimulants, are on backorder  Nonetheless, I would notify Dr Mariella San  Last office visit 7/26/2022 with Dr Mariella San  Patient declined to schedule follow-up with new provider, requested and was provided with telephone number for [facility] for patient to schedule follow-up with Dr Mariella San, I teed up a new Rx for Nuvigil  Please review Rx and sign if appropriate  Also, please consider alternate medication if Nuvigil remains on backorder  Thank you

## 2023-04-05 DIAGNOSIS — G47.411 PRIMARY NARCOLEPSY WITH CATAPLEXY: ICD-10-CM

## 2023-04-05 RX ORDER — ARMODAFINIL 150 MG/1
150 TABLET ORAL DAILY
Qty: 30 TABLET | Refills: 3 | Status: CANCELLED | OUTPATIENT
Start: 2023-04-05 | End: 2023-05-05

## 2023-04-05 RX ORDER — ARMODAFINIL 250 MG/1
150 TABLET ORAL DAILY
Qty: 15 TABLET | Refills: 0 | Status: SHIPPED | OUTPATIENT
Start: 2023-04-05 | End: 2023-04-05

## 2023-04-06 RX ORDER — ARMODAFINIL 150 MG/1
150 TABLET ORAL DAILY
Qty: 30 TABLET | Refills: 0 | Status: SHIPPED | OUTPATIENT
Start: 2023-04-06

## 2023-04-07 ENCOUNTER — TELEPHONE (OUTPATIENT)
Dept: SLEEP CENTER | Facility: CLINIC | Age: 28
End: 2023-04-07

## 2023-04-28 ENCOUNTER — APPOINTMENT (OUTPATIENT)
Dept: URGENT CARE | Age: 28
End: 2023-04-28

## 2023-04-30 ENCOUNTER — APPOINTMENT (OUTPATIENT)
Dept: URGENT CARE | Age: 28
End: 2023-04-30

## 2023-05-04 ENCOUNTER — APPOINTMENT (OUTPATIENT)
Dept: URGENT CARE | Age: 28
End: 2023-05-04

## 2023-06-06 ENCOUNTER — TELEPHONE (OUTPATIENT)
Dept: SLEEP CENTER | Facility: CLINIC | Age: 28
End: 2023-06-06

## 2023-06-06 DIAGNOSIS — K21.9 GASTROESOPHAGEAL REFLUX DISEASE WITHOUT ESOPHAGITIS: ICD-10-CM

## 2023-06-06 RX ORDER — PANTOPRAZOLE SODIUM 40 MG/1
TABLET, DELAYED RELEASE ORAL
Qty: 90 TABLET | Refills: 0 | Status: SHIPPED | OUTPATIENT
Start: 2023-06-06

## 2023-06-06 NOTE — TELEPHONE ENCOUNTER
Received refill request from [facility] pharmacy for Ca, Mg, K, and Na Oxybates Gorflorencio Oneill)  Notified the pharmacist that patient is now being managed by Dr Bryan Russ at [facility]

## 2023-08-29 DIAGNOSIS — K21.9 GASTROESOPHAGEAL REFLUX DISEASE WITHOUT ESOPHAGITIS: ICD-10-CM

## 2023-09-05 RX ORDER — PANTOPRAZOLE SODIUM 40 MG/1
TABLET, DELAYED RELEASE ORAL
Qty: 90 TABLET | Refills: 0 | Status: SHIPPED | OUTPATIENT
Start: 2023-09-05

## 2023-09-19 ENCOUNTER — OFFICE VISIT (OUTPATIENT)
Dept: FAMILY MEDICINE CLINIC | Facility: CLINIC | Age: 28
End: 2023-09-19
Payer: COMMERCIAL

## 2023-09-19 VITALS
TEMPERATURE: 97.1 F | SYSTOLIC BLOOD PRESSURE: 130 MMHG | BODY MASS INDEX: 24.59 KG/M2 | HEIGHT: 69 IN | WEIGHT: 166 LBS | HEART RATE: 106 BPM | OXYGEN SATURATION: 98 % | DIASTOLIC BLOOD PRESSURE: 84 MMHG

## 2023-09-19 DIAGNOSIS — H92.03 OTALGIA OF BOTH EARS: ICD-10-CM

## 2023-09-19 DIAGNOSIS — J01.20 ACUTE NON-RECURRENT ETHMOIDAL SINUSITIS: Primary | ICD-10-CM

## 2023-09-19 DIAGNOSIS — H57.89 EYE DISCHARGE: ICD-10-CM

## 2023-09-19 LAB
SARS-COV-2 AG UPPER RESP QL IA: NEGATIVE
VALID CONTROL: NORMAL

## 2023-09-19 PROCEDURE — 87811 SARS-COV-2 COVID19 W/OPTIC: CPT | Performed by: FAMILY MEDICINE

## 2023-09-19 PROCEDURE — 99214 OFFICE O/P EST MOD 30 MIN: CPT | Performed by: FAMILY MEDICINE

## 2023-09-19 RX ORDER — FLUTICASONE PROPIONATE 50 MCG
2 SPRAY, SUSPENSION (ML) NASAL DAILY
Qty: 11.1 ML | Refills: 1 | Status: SHIPPED | OUTPATIENT
Start: 2023-09-19

## 2023-09-19 RX ORDER — AMOXICILLIN 875 MG/1
875 TABLET, COATED ORAL 2 TIMES DAILY
Qty: 20 TABLET | Refills: 0 | Status: SHIPPED | OUTPATIENT
Start: 2023-09-19 | End: 2023-09-29

## 2023-09-19 NOTE — ASSESSMENT & PLAN NOTE
I reviewed with pt. COVD test neg. Start amox and fluticasone as directed.  Recheck Friday if not improving - earlier if worse

## 2023-09-19 NOTE — ASSESSMENT & PLAN NOTE
No evidence of conjunctivitis. ? Related to obstructed lacrimal ducts? REC: treat sinuses and nasal congestion.  Recheck 1w if not improving

## 2023-09-19 NOTE — PROGRESS NOTES
Name: Sharyle Parcel      : 1995      MRN: [id number]  Encounter Provider: Herman Eaton MD  Encounter Date: 2023   Encounter department: [address] [place] [address]     1. Acute non-recurrent ethmoidal sinusitis  Assessment & Plan:  I reviewed with pt. COVD test neg. Start amox and fluticasone as directed. Recheck Friday if not improving - earlier if worse     Orders:  -     amoxicillin (AMOXIL) 875 mg tablet; Take 1 tablet (875 mg total) by mouth 2 (two) times a day for 10 days  -     fluticasone (FLONASE) 50 mcg/act nasal spray; 2 sprays into each nostril daily 2 sprays bilat qd    2. Otalgia of both ears  Assessment & Plan:  ?related to ETD? Rec; start Flonase as directed. Recheck 1w if not improving - earlier if worse    Orders:  -     POCT Rapid Covid Ag  -     amoxicillin (AMOXIL) 875 mg tablet; Take 1 tablet (875 mg total) by mouth 2 (two) times a day for 10 days  -     fluticasone (FLONASE) 50 mcg/act nasal spray; 2 sprays into each nostril daily 2 sprays bilat qd    3. Eye discharge  Assessment & Plan:  No evidence of conjunctivitis. ? Related to obstructed lacrimal ducts? REC: treat sinuses and nasal congestion. Recheck 1w if not improving             Subjective      61-year-old female presents for a couple complaints  -Patient developed mild nasal congestion on Friday. She felt better Saturday but on  noticed the congestion returned along with some ear discomfort, chills and body aches. She took a COVID test at home which was negative. Patient does have hearing loss for which she uses hearing aids. She denies any worsening hearing with the ear discomfort. Patient does have some sinus pain in the ethmoid and maxillary areas. She does have clear discharge from her nose. - pt also recently had eyelash extensions applied (last weekend). Pt awoke the next morning with eye discharge and difficulty opening her lids.  Since then, she has been experiencing some discharge. No change in vision or eye pain. Review of Systems   Constitutional: Positive for fatigue. Negative for chills (resolved). HENT: Positive for congestion, ear pain, sinus pressure and sinus pain. Negative for ear discharge, sore throat, tinnitus and trouble swallowing. Eyes: Positive for discharge. Negative for photophobia, pain, redness, itching and visual disturbance. Respiratory: Negative. Cardiovascular: Negative. Skin: Negative. Neurological: Negative. Current Outpatient Medications on File Prior to Visit   Medication Sig   • cetirizine (ZyrTEC) 10 mg tablet Take 10 mg by mouth daily   • Cholecalciferol 25 MCG (1000 UT) capsule Take by mouth   • hydrocortisone 1 % ointment APPLY TWICE A DAY X 2 WEEKS TO THE AFFECTED AREA ON FACE AND BEHIND EARS THEN STOP   • Kariva 0.15-0.02/0.01 MG (21/5) per tablet Take 1 tablet by mouth daily   • Nuvigil 150 MG tablet Take 1 tablet (150 mg total) by mouth daily   • pantoprazole (PROTONIX) 40 mg tablet TAKE 1 TABLET(40 MG) BY MOUTH DAILY   • Ca, Mg, K, and Na Oxybates (Xywav) 500 MG/ML SOLN Take 3 g by mouth 2 (two) times a day First dose (bedtime):3 g + Second dose (2.5-4 hrs later): 3 g = 6 g Total Nightly Dose   • [DISCONTINUED] metroNIDAZOLE (METROGEL) 0.75 % gel Apply topically 2 (two) times a day (Patient not taking: Reported on 9/19/2023)       Objective     /84 (BP Location: Left arm, Patient Position: Sitting, Cuff Size: Adult)   Pulse (!) 106   Temp (!) 97.1 °F (36.2 °C)   Ht 5' 9" (1.753 m)   Wt 75.3 kg (166 lb)   SpO2 98%   BMI 24.51 kg/m²     Physical Exam  Vitals reviewed. HENT:      Head: Normocephalic. Right Ear: Tympanic membrane, ear canal and external ear normal.      Left Ear: Tympanic membrane, ear canal and external ear normal.      Ears:      Comments: Hearing aids removed for exam     Nose: Congestion present.       Comments: Ethmoid and maxillary sinuses TTP     Mouth/Throat: Mouth: Mucous membranes are moist.   Eyes:      Extraocular Movements: Extraocular movements intact. Conjunctiva/sclera: Conjunctivae normal.      Pupils: Pupils are equal, round, and reactive to light. Comments: Scant discharge at lacrimal duct opening bilat   Cardiovascular:      Rate and Rhythm: Normal rate and regular rhythm. Pulses: Normal pulses. Pulmonary:      Effort: Pulmonary effort is normal.   Musculoskeletal:      Cervical back: No tenderness. Lymphadenopathy:      Cervical: No cervical adenopathy. Skin:     General: Skin is warm. Capillary Refill: Capillary refill takes less than 2 seconds. Neurological:      General: No focal deficit present. Mental Status: She is alert and oriented to person, place, and time.        Eligio Peck MD

## 2023-10-31 DIAGNOSIS — K21.9 GASTROESOPHAGEAL REFLUX DISEASE WITHOUT ESOPHAGITIS: ICD-10-CM

## 2023-10-31 RX ORDER — PANTOPRAZOLE SODIUM 40 MG/1
TABLET, DELAYED RELEASE ORAL
Qty: 90 TABLET | Refills: 1 | Status: SHIPPED | OUTPATIENT
Start: 2023-10-31

## 2023-11-01 ENCOUNTER — OFFICE VISIT (OUTPATIENT)
Dept: FAMILY MEDICINE CLINIC | Facility: CLINIC | Age: 28
End: 2023-11-01
Payer: COMMERCIAL

## 2023-11-01 VITALS
WEIGHT: 165 LBS | OXYGEN SATURATION: 96 % | SYSTOLIC BLOOD PRESSURE: 138 MMHG | BODY MASS INDEX: 24.44 KG/M2 | HEART RATE: 110 BPM | HEIGHT: 69 IN | DIASTOLIC BLOOD PRESSURE: 88 MMHG | TEMPERATURE: 97.6 F

## 2023-11-01 DIAGNOSIS — J01.40 ACUTE NON-RECURRENT PANSINUSITIS: Primary | ICD-10-CM

## 2023-11-01 PROCEDURE — 99214 OFFICE O/P EST MOD 30 MIN: CPT | Performed by: NURSE PRACTITIONER

## 2023-11-01 RX ORDER — AMOXICILLIN 875 MG/1
875 TABLET, COATED ORAL 2 TIMES DAILY
Qty: 14 TABLET | Refills: 0 | Status: SHIPPED | OUTPATIENT
Start: 2023-11-01 | End: 2023-11-08

## 2023-11-01 NOTE — PROGRESS NOTES
Assessment/Plan:    No problem-specific Assessment & Plan notes found for this encounter. Diagnoses and all orders for this visit:    Acute non-recurrent pansinusitis  -     amoxicillin (AMOXIL) 875 mg tablet; Take 1 tablet (875 mg total) by mouth 2 (two) times a day for 7 days    Other orders  -     tretinoin (RETIN-A) 0.025 % cream        Discussed with patient plan to treat with 7 day course of amoxicillin  Patient instructed to call if no improvement in 72 hours or symptoms worsen    Subjective:      Patient ID: Anusha Kirby is a 33 y.o. female.    33 y. o.female presenting with sore throat, sinus pressure, deep cough, nasal congestion for the past three days. She reports that she recently returned from a trip to [place]. She denies fever, chills, shortness of breath or GI symptoms. She reports that slight generalized body aches in the beginning but that has resolved. The following portions of the patient's history were reviewed and updated as appropriate: allergies, current medications, past family history, past medical history, past social history, past surgical history, and problem list.    Review of Systems   Constitutional:  Negative for chills, fatigue and fever. HENT:  Positive for congestion, ear pain, postnasal drip, rhinorrhea, sinus pressure, sinus pain and sore throat. Respiratory:  Positive for cough and chest tightness. Negative for shortness of breath and wheezing. Cardiovascular: Negative. Gastrointestinal:  Negative for abdominal distention, abdominal pain and diarrhea. Musculoskeletal:  Positive for myalgias. Neurological:  Negative for dizziness, light-headedness and headaches. Psychiatric/Behavioral: Negative.          Objective:      /88 (BP Location: Right arm, Patient Position: Sitting, Cuff Size: Standard)   Pulse (!) 110   Temp 97.6 °F (36.4 °C)   Ht 5' 9" (1.753 m)   Wt 74.8 kg (165 lb)   LMP 10/31/2023 (Exact Date)   SpO2 96%   BMI 24.37 kg/m² (Reviewed)       Physical Exam  Vitals reviewed. Constitutional:       General: She is not in acute distress. Appearance: She is well-developed and well-groomed. She is not ill-appearing. HENT:      Head: Normocephalic and atraumatic. Right Ear: Tympanic membrane, ear canal and external ear normal.      Left Ear: Tympanic membrane, ear canal and external ear normal.      Nose: Congestion present. Right Sinus: Maxillary sinus tenderness and frontal sinus tenderness present. Left Sinus: Maxillary sinus tenderness and frontal sinus tenderness present. Mouth/Throat:      Lips: Pink. Mouth: Mucous membranes are moist.      Pharynx: Oropharynx is clear. Eyes:      General: Lids are normal.      Extraocular Movements: Extraocular movements intact. Conjunctiva/sclera: Conjunctivae normal.      Pupils: Pupils are equal, round, and reactive to light. Neck:      Trachea: Trachea and phonation normal.   Cardiovascular:      Rate and Rhythm: Normal rate and regular rhythm. Heart sounds: Normal heart sounds. Pulmonary:      Effort: Pulmonary effort is normal.      Breath sounds: Normal breath sounds. Musculoskeletal:      Cervical back: Neck supple. Skin:     General: Skin is warm and dry. Capillary Refill: Capillary refill takes less than 2 seconds. Neurological:      Mental Status: She is alert and oriented to person, place, and time. Psychiatric:         Mood and Affect: Mood normal.         Behavior: Behavior normal. Behavior is cooperative.

## 2023-11-06 DIAGNOSIS — J06.9 UPPER RESPIRATORY TRACT INFECTION, UNSPECIFIED TYPE: Primary | ICD-10-CM

## 2023-11-06 RX ORDER — DEXTROMETHORPHAN HYDROBROMIDE AND PROMETHAZINE HYDROCHLORIDE 15; 6.25 MG/5ML; MG/5ML
5 SYRUP ORAL 4 TIMES DAILY PRN
Qty: 180 ML | Refills: 0 | Status: SHIPPED | OUTPATIENT
Start: 2023-11-06

## 2024-02-21 PROBLEM — Z01.419 ENCOUNTER FOR GYNECOLOGICAL EXAMINATION WITHOUT ABNORMAL FINDING: Status: RESOLVED | Noted: 2018-04-17 | Resolved: 2024-02-21

## 2024-02-21 PROBLEM — Z01.419 GYNECOLOGIC EXAM NORMAL: Status: RESOLVED | Noted: 2019-07-16 | Resolved: 2024-02-21

## 2024-02-21 PROBLEM — J01.90 ACUTE SINUSITIS: Status: RESOLVED | Noted: 2017-09-22 | Resolved: 2024-02-21

## 2024-04-15 ENCOUNTER — OFFICE VISIT (OUTPATIENT)
Dept: FAMILY MEDICINE CLINIC | Facility: CLINIC | Age: 29
End: 2024-04-15
Payer: COMMERCIAL

## 2024-04-15 VITALS
HEART RATE: 100 BPM | HEIGHT: 69 IN | BODY MASS INDEX: 25.62 KG/M2 | TEMPERATURE: 97.3 F | DIASTOLIC BLOOD PRESSURE: 82 MMHG | OXYGEN SATURATION: 97 % | SYSTOLIC BLOOD PRESSURE: 128 MMHG | WEIGHT: 173 LBS

## 2024-04-15 DIAGNOSIS — J02.9 ACUTE PHARYNGITIS, UNSPECIFIED ETIOLOGY: Primary | ICD-10-CM

## 2024-04-15 PROCEDURE — 99214 OFFICE O/P EST MOD 30 MIN: CPT | Performed by: NURSE PRACTITIONER

## 2024-04-15 RX ORDER — TRIAMCINOLONE ACETONIDE 1 MG/G
CREAM TOPICAL
COMMUNITY
Start: 2024-01-30

## 2024-04-15 RX ORDER — CEFUROXIME AXETIL 500 MG/1
500 TABLET ORAL EVERY 12 HOURS SCHEDULED
Qty: 14 TABLET | Refills: 0 | Status: SHIPPED | OUTPATIENT
Start: 2024-04-15 | End: 2024-04-22

## 2024-04-15 NOTE — PROGRESS NOTES
"Assessment/Plan:     Diagnoses and all orders for this visit:    Acute pharyngitis, unspecified etiology  -     cefuroxime (CEFTIN) 500 mg tablet; Take 1 tablet (500 mg total) by mouth every 12 (twelve) hours for 7 days    Other orders  -     triamcinolone (KENALOG) 0.1 % cream; APPLY TOPICALLY TO THE AFFECTED AREA TWICE DAILY FOR 2 WEEKS. MAX THEN WEAN TO AS NEEDED FLARES ON THE ARMS        Discussed with patient plan to treat with a 7 day course of cefuroxime  Patient instructed to call if no improvement in 72 hours or symptoms worsen    Subjective:      Patient ID: Rica Hopper is a 28 y.o. female.    28 y.o.female presenting with bilateral ear pain and sore throat for the past day. She reports that she has a long history of enlarged tonsils. She denies fever, chills, generalized body aches cough, shortness of breath or headache.      The following portions of the patient's history were reviewed and updated as appropriate: allergies, current medications, past family history, past medical history, past social history, past surgical history, and problem list.    Review of Systems   Constitutional:  Negative for chills, fatigue and fever.   HENT:  Positive for ear pain and sore throat. Negative for congestion, ear discharge, mouth sores, postnasal drip, rhinorrhea, sinus pressure and sinus pain.    Respiratory: Negative.     Cardiovascular: Negative.    Gastrointestinal: Negative.    Genitourinary: Negative.    Musculoskeletal: Negative.    Neurological: Negative.    Psychiatric/Behavioral: Negative.         Objective:    /82   Pulse 100   Temp (!) 97.3 °F (36.3 °C)   Ht 5' 9\" (1.753 m)   Wt 78.5 kg (173 lb)   LMP  (LMP Unknown)   SpO2 97%   BMI 25.55 kg/m² (Reviewed)     Physical Exam  Vitals reviewed.   Constitutional:       General: She is not in acute distress.     Appearance: She is well-developed and well-groomed. She is not ill-appearing.   HENT:      Head: Normocephalic and atraumatic.      " Right Ear: Ear canal and external ear normal. Tympanic membrane is erythematous.      Left Ear: Ear canal and external ear normal. Tympanic membrane is erythematous.      Nose: Nose normal.      Mouth/Throat:      Mouth: Mucous membranes are moist.      Pharynx: Pharyngeal swelling and posterior oropharyngeal erythema present.      Tonsils: No tonsillar exudate. 2+ on the right. 2+ on the left.   Eyes:      General: Lids are normal.      Extraocular Movements: Extraocular movements intact.      Conjunctiva/sclera: Conjunctivae normal.      Pupils: Pupils are equal, round, and reactive to light.   Neck:      Trachea: Trachea and phonation normal.   Cardiovascular:      Rate and Rhythm: Normal rate and regular rhythm.      Heart sounds: Normal heart sounds.   Pulmonary:      Effort: Pulmonary effort is normal.      Breath sounds: Normal breath sounds.   Musculoskeletal:      Cervical back: Neck supple.   Skin:     General: Skin is warm and dry.      Capillary Refill: Capillary refill takes less than 2 seconds.   Neurological:      General: No focal deficit present.      Mental Status: She is alert and oriented to person, place, and time.   Psychiatric:         Mood and Affect: Mood normal.         Behavior: Behavior normal. Behavior is cooperative.

## 2024-04-30 ENCOUNTER — OFFICE VISIT (OUTPATIENT)
Dept: FAMILY MEDICINE CLINIC | Facility: CLINIC | Age: 29
End: 2024-04-30
Payer: COMMERCIAL

## 2024-04-30 VITALS
HEART RATE: 99 BPM | BODY MASS INDEX: 25.62 KG/M2 | OXYGEN SATURATION: 95 % | DIASTOLIC BLOOD PRESSURE: 78 MMHG | HEIGHT: 69 IN | TEMPERATURE: 97.7 F | WEIGHT: 173 LBS | SYSTOLIC BLOOD PRESSURE: 122 MMHG

## 2024-04-30 DIAGNOSIS — J02.9 ACUTE PHARYNGITIS, UNSPECIFIED ETIOLOGY: Primary | ICD-10-CM

## 2024-04-30 PROCEDURE — 99213 OFFICE O/P EST LOW 20 MIN: CPT | Performed by: FAMILY MEDICINE

## 2024-04-30 RX ORDER — AZITHROMYCIN 250 MG/1
TABLET, FILM COATED ORAL
Qty: 6 TABLET | Refills: 0 | Status: SHIPPED | OUTPATIENT
Start: 2024-04-30 | End: 2024-05-04

## 2024-04-30 NOTE — PROGRESS NOTES
Patient ID: Rica Hopper is a 28 y.o. female.    HPI: 28 y.o.female presenting with 5 day history of sore throat and swollen glands.  It is painful to swallow.    Pt denies any fever, chills, or body aches.  She finished a course of ceftin and her sore throat started shortly after that .      SUBJECTIVE    Family History   Problem Relation Age of Onset    Diabetes type II Mother         without complications    Heart attack Mother     Hypertension Father         benign essential     Diabetes Maternal Grandmother     Ovarian cancer Other     Diabetes type II Other         without complications     Social History     Socioeconomic History    Marital status: Single     Spouse name: Not on file    Number of children: Not on file    Years of education: Not on file    Highest education level: Not on file   Occupational History    Occupation: Student   Tobacco Use    Smoking status: Never     Passive exposure: Never    Smokeless tobacco: Never   Vaping Use    Vaping status: Never Used   Substance and Sexual Activity    Alcohol use: No    Drug use: No    Sexual activity: Yes     Partners: Male     Birth control/protection: OCP, Condom Male   Other Topics Concern    Not on file   Social History Narrative    Feels safe at home     Social Determinants of Health     Financial Resource Strain: Not on file   Food Insecurity: Not on file   Transportation Needs: Not on file   Physical Activity: Not on file   Stress: Not on file   Social Connections: Not on file   Intimate Partner Violence: Not on file   Housing Stability: Not on file     Past Medical History:   Diagnosis Date    Hearing impaired person, bilateral     Narcolepsy      Past Surgical History:   Procedure Laterality Date    TYMPANOSTOMY TUBE PLACEMENT      last assessed: 04/06/2017; remove chornic fluid    WISDOM TOOTH EXTRACTION       Allergies   Allergen Reactions    Albuterol Other (See Comments) and Rash     unknown       Current Outpatient Medications:      azithromycin (ZITHROMAX) 250 mg tablet, Take 2 tablets today then 1 tablet daily x 4 days, Disp: 6 tablet, Rfl: 0    cetirizine (ZyrTEC) 10 mg tablet, Take 10 mg by mouth daily, Disp: , Rfl:     Cholecalciferol 25 MCG (1000 UT) capsule, Take by mouth, Disp: , Rfl:     fluticasone (FLONASE) 50 mcg/act nasal spray, 2 sprays into each nostril daily 2 sprays bilat qd, Disp: 11.1 mL, Rfl: 1    hydrocortisone 1 % ointment, , Disp: , Rfl:     Kariva 0.15-0.02/0.01 MG (21/5) per tablet, Take 1 tablet by mouth daily, Disp: , Rfl:     Nuvigil 150 MG tablet, TAKE 1 TABLET(150 MG) BY MOUTH DAILY, Disp: 90 tablet, Rfl: 1    pantoprazole (PROTONIX) 40 mg tablet, TAKE 1 TABLET(40 MG) BY MOUTH DAILY, Disp: 90 tablet, Rfl: 1    tretinoin (RETIN-A) 0.025 % cream, , Disp: , Rfl:     triamcinolone (KENALOG) 0.1 % cream, APPLY TOPICALLY TO THE AFFECTED AREA TWICE DAILY FOR 2 WEEKS. MAX THEN WEAN TO AS NEEDED FLARES ON THE ARMS, Disp: , Rfl:     Ca, Mg, K, and Na Oxybates (Xywav) 500 MG/ML SOLN, Take 3 g by mouth 2 (two) times a day First dose (bedtime):3 g + Second dose (2.5-4 hrs later): 3 g = 6 g Total Nightly Dose, Disp: 360 mL, Rfl: 5    Review of Systems  Constitutional:     Denies fever, chills ,fatigue ,weakness ,weight loss, weight gain     ENT: Denies loss of hearing ,nosebleed, nasal discharge ,hoarseness;  nasal congestion  and ear pain, but admits to sore throat   Pulmonary: Denies shortness of breath ,dyspnea on exertion, orthopnea, cough and  pnd  Cardiovascular:  Denies bradycardia , tachycardia  ,palpations, lower extremity edema leg, claudication  Breast:  Denies new or changing breast lumps ,nipple discharge ,nipple changes  Abdomen:  Denies abdominal pain , anorexia , indigestion, nausea, vomiting, constipation, diarrhea  Musculoskeletal: Denies myalgias, arthralgias, joint swelling, joint stiffness , limb pain, limb swelling  Gu: Denies polyuria or dysuria  Lymph:+ swollen glands  Skin: Denies skin rash, skin lesion,  "skin wound, itching, dry skin  Neuro: Denies headache, numbness, tingling, confusion, loss of consciousness, dizziness, vertigo  Psychiatric: Denies feelings of depression, suicidal ideation, anxiety, sleep disturbances    OBJECTIVE  /78   Pulse 99   Temp 97.7 °F (36.5 °C)   Ht 5' 9\" (1.753 m)   Wt 78.5 kg (173 lb)   LMP  (LMP Unknown)   SpO2 95%   BMI 25.55 kg/m²   Constitutional:   NAD, well appearing and well nourished      ENT:   Conjunctiva and lids: no injection, edema, or discharge     Pupils and iris: [place] bilaterally    External inspection of ears and nose: normal without deformities or discharge.      Otoscopic exam: Canals patent with right tm erythematous   Nasal mucosa, septum and turbinates: Negative for turbinate injection, nasal discharge         Oropharynx:  Moist mucosa, normal tongue and tonsils without lesions.+ erythema and injection of posterior pharynx; tonsilar hypertrophy  Pulmonary:Respiratory effort normal rate and rhythm, no increased work of breathing. Auscultation of lungs:  Clear bilaterally with no adventitious breath sounds       Cardiovascular: regular rate and rhythm, S1 and S2, no murmur, no edema and/or varicosities of LE      Abdomen: Soft and non-distended     Positive bowel sounds      No heptomegaly or splenomegaly      Gu: no suprapubic tenderness or CVA tenderness  Lymphatic: + anterior or posterior cervical lymphadenopathy         Musculoskeletal:  Gait and station: Normal gait      Digits and nails normal without clubbing or cyanosis       Inspection/palpation of joints, bones, and muscles:  No joint tenderness, swelling, full active and passive range of motion       Skin: Normal skin turgor and no rashes      Neuro:    Normal reflexes     Psych:   alert and oriented to person, place and time     normal mood and affect       Assessment/Plan:Diagnoses and all orders for this visit:    Acute pharyngitis, unspecified etiology  -     azithromycin (ZITHROMAX) 250 " mg tablet; Take 2 tablets today then 1 tablet daily x 4 days        Reviewed with patient plan to treat with above plan.    Patient instructed to call in 72 hours if not feeling better or if symptoms worsen   Answers submitted by the patient for this visit:  Sore Throat Questionnaire (Submitted on 4/30/2024)  Chief Complaint: Sore throat  Chronicity: recurrent  Onset: yesterday  Progression since onset: unchanged  Pain worse on: neither  Fever: no fever  Fever duration: less than 1 day  Pain - numeric: 4/10  abdominal pain: No  congestion: Yes  cough: No  diarrhea: No  drooling: No  ear discharge: No  ear pain: Yes  headaches: No  hoarse voice: No  neck pain: No  plugged ear sensation: No  shortness of breath: No  stridor: No  swollen glands: Yes  trouble swallowing: Yes  vomiting: No  strep: No  mono: No

## 2024-06-11 ENCOUNTER — OFFICE VISIT (OUTPATIENT)
Dept: FAMILY MEDICINE CLINIC | Facility: CLINIC | Age: 29
End: 2024-06-11
Payer: COMMERCIAL

## 2024-06-11 VITALS
TEMPERATURE: 97.5 F | WEIGHT: 172.2 LBS | SYSTOLIC BLOOD PRESSURE: 122 MMHG | OXYGEN SATURATION: 99 % | BODY MASS INDEX: 25.51 KG/M2 | DIASTOLIC BLOOD PRESSURE: 82 MMHG | HEART RATE: 77 BPM | HEIGHT: 69 IN

## 2024-06-11 DIAGNOSIS — J01.90 ACUTE SINUSITIS, RECURRENCE NOT SPECIFIED, UNSPECIFIED LOCATION: Primary | ICD-10-CM

## 2024-06-11 PROCEDURE — 99213 OFFICE O/P EST LOW 20 MIN: CPT | Performed by: FAMILY MEDICINE

## 2024-06-11 RX ORDER — CEFUROXIME AXETIL 500 MG/1
500 TABLET ORAL EVERY 12 HOURS SCHEDULED
Qty: 20 TABLET | Refills: 1 | Status: SHIPPED | OUTPATIENT
Start: 2024-06-11 | End: 2024-06-21

## 2024-06-11 RX ORDER — PREDNISONE 10 MG/1
TABLET ORAL
Qty: 26 TABLET | Refills: 0 | Status: SHIPPED | OUTPATIENT
Start: 2024-06-11

## 2024-06-12 NOTE — PROGRESS NOTES
Patient ID: Rica Hopper is a 29 y.o. female.    HPI: 29 y.o.female presenting with symptoms of sinus pain,pressure, nasal congestion, pnd dry cough , ear and throat pain.  Symptoms have been going on for the past 3 days.    SUBJECTIVE    Family History   Problem Relation Age of Onset    Diabetes type II Mother         without complications    Heart attack Mother     Hypertension Father         benign essential     Diabetes Maternal Grandmother     Ovarian cancer Other     Diabetes type II Other         without complications     Social History     Socioeconomic History    Marital status: Single     Spouse name: Not on file    Number of children: Not on file    Years of education: Not on file    Highest education level: Not on file   Occupational History    Occupation: Student   Tobacco Use    Smoking status: Never     Passive exposure: Never    Smokeless tobacco: Never   Vaping Use    Vaping status: Never Used   Substance and Sexual Activity    Alcohol use: No    Drug use: No    Sexual activity: Yes     Partners: Male     Birth control/protection: OCP, Condom Male   Other Topics Concern    Not on file   Social History Narrative    Feels safe at home     Social Determinants of Health     Financial Resource Strain: Not on file   Food Insecurity: Not on file   Transportation Needs: Not on file   Physical Activity: Not on file   Stress: Not on file   Social Connections: Not on file   Intimate Partner Violence: Not on file   Housing Stability: Not on file     Past Medical History:   Diagnosis Date    Hearing impaired person, bilateral     Narcolepsy      Past Surgical History:   Procedure Laterality Date    TYMPANOSTOMY TUBE PLACEMENT      last assessed: 04/06/2017; remove chornic fluid    WISDOM TOOTH EXTRACTION       Allergies   Allergen Reactions    Albuterol Other (See Comments) and Rash     unknown       Current Outpatient Medications:     cefuroxime (CEFTIN) 500 mg tablet, Take 1 tablet (500 mg total) by mouth  every 12 (twelve) hours for 10 days, Disp: 20 tablet, Rfl: 1    cetirizine (ZyrTEC) 10 mg tablet, Take 10 mg by mouth daily, Disp: , Rfl:     Cholecalciferol 25 MCG (1000 UT) capsule, Take by mouth, Disp: , Rfl:     fluticasone (FLONASE) 50 mcg/act nasal spray, 2 sprays into each nostril daily 2 sprays bilat qd, Disp: 11.1 mL, Rfl: 1    hydrocortisone 1 % ointment, , Disp: , Rfl:     Kariva 0.15-0.02/0.01 MG (21/5) per tablet, Take 1 tablet by mouth daily, Disp: , Rfl:     Nuvigil 150 MG tablet, TAKE 1 TABLET(150 MG) BY MOUTH DAILY, Disp: 90 tablet, Rfl: 1    pantoprazole (PROTONIX) 40 mg tablet, TAKE 1 TABLET(40 MG) BY MOUTH DAILY, Disp: 90 tablet, Rfl: 1    predniSONE 10 mg tablet, 3 tabs po bid x2 days, then 2 tabs po bid x2 days, then 1 tab bid x2 days, then 1 daily until done., Disp: 26 tablet, Rfl: 0    tretinoin (RETIN-A) 0.025 % cream, , Disp: , Rfl:     triamcinolone (KENALOG) 0.1 % cream, as needed, Disp: , Rfl:     Ca, Mg, K, and Na Oxybates (Xywav) 500 MG/ML SOLN, Take 3 g by mouth 2 (two) times a day First dose (bedtime):3 g + Second dose (2.5-4 hrs later): 3 g = 6 g Total Nightly Dose, Disp: 360 mL, Rfl: 5    Review of Systems  Constitutional:     Denies fever, chills, fatigue, weakness ,weight loss, weight gain      ENT: Denies earache, loss of hearing, nosebleed, nasal discharge,but complains of nasal congestion, sore throat,hoarseness and sinus pain and pressure    Pulmonary: Denies shortness of breath ,cough , dyspnea on exertionon, orthopnea ,+ PND   Cardiovascular:  Denies bradycardia , tachycardia ,palpations, lower extremity, edema leg, claudication  Breast:  Denies new or changing breast lumps,  nipple discharge, nipple changes,  Abdomen:  Denies abdominal pain , anorexia ,indigestion, nausea ,vomiting, constipation , diarrhea  Musculoskeletal: Denies myalgias, arthralgias, joint swelling, joint stiffness ,limb pain, limb swelling  Lymph:+ swollen glands  Gu: no dysuria or urinary  "frequency  Skin: Denies skin rash, skin lesion, skin wound, itching,dry skin  Neuro: Denies headache, numbness, tingling, confusion, loss of consciousness, dizziness ,vertigo  Psychiatric: Denies feelings of depression, suicidal ideation, anxiety, sleep disturbances    OBJECTIVE  /82 (BP Location: Right arm, Patient Position: Sitting, Cuff Size: Adult)   Pulse 77   Temp 97.5 °F (36.4 °C)   Ht 5' 9\" (1.753 m)   Wt 78.1 kg (172 lb 3.2 oz)   LMP 06/11/2024 (Exact Date)   SpO2 99%   BMI 25.43 kg/m²   Constitutional:   NAD, well appearing and well nourished      ENT:   Conjunctiva and lids: no injection, edema, or discharge    Pupils and iris: AUGUSTINA bilaterally   External inspection of ears and nose: normal without deformities or discharge.      Otoscopic exam: Canals patent ; tm are dull, with with erythem and effusions  ENasal mucosa, septum and turbinates: Turbinae injection with discharge   Oropharynx:  Moist mucosa, normal tongue and tonsils without lesions.Erythema and injection  of post pharynx with pnd      Pulmonary:Respiratory effort normal rate and rhythm, no increased work of breathing. Auscultation of lungs:  Clear bilaterally with no adventitious breath sounds       Cardiovascular: regular rate and rhythm, S1 and S2, no murmur, no edema and/or varicosities of LE      Abdomen: Soft and non-distended    Positive bowel sounds    No heptomegaly or splenomegaly    Lymphatic: Anterior  cervical lymphadenopathy         Muscskeletal:  Gait and station: Normal gait     Digits and nails normal without clubbing or cyanosis     Inspection/palpation of joints, bones, and muscles:  No joint tenderness, swelling, full active and passive range of motion      Gu: no suprabubic tenderness, CVA tenderness or urethral discharge  Skin: Normal skin turgor and no rashes    Neuro:    Normal reflexes   Psych:   alert and oriented to person, place and time  normal mood and affec      Assessment/Plan:Diagnoses and all " orders for this visit:    Acute sinusitis, recurrence not specified, unspecified location  Comments:  Patient to take Mucinex twice daily  Orders:  -     predniSONE 10 mg tablet; 3 tabs po bid x2 days, then 2 tabs po bid x2 days, then 1 tab bid x2 days, then 1 daily until done.  -     cefuroxime (CEFTIN) 500 mg tablet; Take 1 tablet (500 mg total) by mouth every 12 (twelve) hours for 10 days        Reviewed with patient plan to treat with above plan.  Patient instructed to call in 72 hours if not feeling better or if symptoms worsen   Answers submitted by the patient for this visit:  Ear Pain Questionnaire (Submitted on 6/9/2024)  Chief Complaint: Ear pain  Affected ear: both  Chronicity: new  Onset: yesterday  Progression since onset: unchanged  Frequency: every few hours  Fever: no fever  Pain - numeric: 3/10  abdominal pain: No  cough: No  diarrhea: No  ear discharge: No  headaches: Yes  hearing loss: No  neck pain: No  rash: No  rhinorrhea: Yes  sore throat: Yes  vomiting: No

## 2024-07-05 PROBLEM — G47.411: Status: ACTIVE | Noted: 2017-08-15

## 2024-07-12 ENCOUNTER — TELEPHONE (OUTPATIENT)
Age: 29
End: 2024-07-12

## 2024-07-12 NOTE — TELEPHONE ENCOUNTER
Pts mom requesting tdap order to be placed. Pts sister is having a baby at the end of Aug. Pts mom is coming in for an appointment on 8/6, and would like to bring Rica along to receive the vaccine at that time      Please advise, thank you

## 2024-07-30 DIAGNOSIS — K21.9 GASTROESOPHAGEAL REFLUX DISEASE WITHOUT ESOPHAGITIS: ICD-10-CM

## 2024-07-30 RX ORDER — PANTOPRAZOLE SODIUM 40 MG/1
TABLET, DELAYED RELEASE ORAL
Qty: 100 TABLET | Refills: 1 | Status: SHIPPED | OUTPATIENT
Start: 2024-07-30

## 2024-08-06 ENCOUNTER — CLINICAL SUPPORT (OUTPATIENT)
Dept: FAMILY MEDICINE CLINIC | Facility: CLINIC | Age: 29
End: 2024-08-06

## 2024-08-06 VITALS — SYSTOLIC BLOOD PRESSURE: 138 MMHG | DIASTOLIC BLOOD PRESSURE: 84 MMHG

## 2024-08-06 DIAGNOSIS — Z23 ENCOUNTER FOR IMMUNIZATION: Primary | ICD-10-CM

## 2024-12-09 ENCOUNTER — TELEPHONE (OUTPATIENT)
Age: 29
End: 2024-12-09

## 2024-12-09 NOTE — TELEPHONE ENCOUNTER
"[facility] [place] Progress Note   LOS: 3 days   Patient Care Team:  Provider, No Known as PCP - General    Chief Complaint:    Chief Complaint   Patient presents with   • Chest Pain        Subjective     Interval History:   Patient Denies: no complaints today  Patient Complaints: no complaints today  History taken from: patient family RN    S/P LHC performed yesterday per Dr. Petersen. Kidney function improved today. H/H stable. Patient sitting up in bed at the time of my exam, talking with spouse.     Objective     Vital Sign Min/Max for last 24 hours  Temp  Min: 97.1 °F (36.2 °C)  Max: 98.4 °F (36.9 °C)   BP  Min: 124/78  Max: 153/75   Pulse  Min: 63  Max: 78   Resp  Min: 18  Max: 20   SpO2  Min: 92 %  Max: 98 %   Flow (L/min)  Min: 2  Max: 2   Weight  Min: 112 kg (247 lb 3.2 oz)  Max: 112 kg (247 lb 3.2 oz)     Flowsheet Rows      First Filed Value   Admission Height  185.4 cm (73\") Documented at 06/05/2020 1732   Admission Weight  117 kg (257 lb 9.6 oz) Documented at 06/05/2020 1732            06/05/20  2040 06/06/20  0500 06/09/20  0607   Weight: 114 kg (252 lb 3.2 oz) 113 kg (248 lb 6.4 oz) 112 kg (247 lb 3.2 oz)       Physical Exam:  Physical Exam   Constitutional: He is oriented to person, place, and time. He appears well-developed and well-nourished. No distress.   HENT:   Head: Normocephalic and atraumatic.   Right Ear: External ear normal.   Left Ear: External ear normal.   Eyes: Pupils are equal, round, and reactive to light. Conjunctivae are normal.   Neck: Neck supple. No JVD present.   Cardiovascular: Normal rate, regular rhythm, S1 normal, S2 normal, normal heart sounds and intact distal pulses. PMI is not displaced. Exam reveals no gallop, no S3, no S4 and no friction rub.   No murmur heard.  Pulmonary/Chest: Effort normal. He has wheezes. He has rales.   Abdominal: Soft. Bowel sounds are normal. He exhibits distension.   Musculoskeletal: He exhibits no edema or tenderness.   Neurological: He is alert " Patient called to schedule sick appt with PCP tomorrow after 12 PM. As of now I do not see any openings with PCP. She is asking for a return phone call if anything opens up with  within preferred time please. Symptoms: cough, nasal congestion, mostly left ear pain, headache has subsided    and oriented to person, place, and time.   Skin: Skin is warm and dry. Capillary refill takes 2 to 3 seconds. No rash noted. He is not diaphoretic. No erythema. No pallor.   Psychiatric: He has a normal mood and affect. His behavior is normal. Judgment and thought content normal.        Results Review:     Results from last 7 days   Lab Units 06/09/20  0650 06/08/20  0547 06/07/20  0814  06/05/20  1749   SODIUM mmol/L 138 136 138   < > 141   POTASSIUM mmol/L 4.3 3.8 3.9   < > 3.9   CHLORIDE mmol/L 102 97* 99   < > 99   CO2 mmol/L 31.0* 30.0* 31.0*   < > 31.0*   BUN mg/dL 24* 27* 25*   < > 17   CREATININE mg/dL 1.64* 1.91* 1.71*   < > 1.84*   CALCIUM mg/dL 8.3* 8.2* 8.6   < > 8.8   BILIRUBIN mg/dL  --  0.7  --   --  0.3   ALK PHOS U/L  --  60  --   --  68   ALT (SGPT) U/L  --  17  --   --  18   AST (SGOT) U/L  --  19  --   --  20   GLUCOSE mg/dL 95 101* 99   < > 153*    < > = values in this interval not displayed.       Estimated Creatinine Clearance: 55.7 mL/min (A) (by C-G formula based on SCr of 1.64 mg/dL (H)).    Results from last 7 days   Lab Units 06/05/20  1749   MAGNESIUM mg/dL 1.9             Results from last 7 days   Lab Units 06/09/20  0650 06/08/20  0547 06/07/20  0710 06/06/20  0716 06/05/20  1749   WBC 10*3/mm3 9.45 10.26 9.70 10.38 10.96*   HEMOGLOBIN g/dL 13.1 14.1 14.2 14.3 14.5   PLATELETS 10*3/mm3 156 175 176 169 188       Results from last 7 days   Lab Units 06/07/20  2116 06/05/20  1749   INR  1.06 0.93     Lab Results   Component Value Date    PROBNP 2,934.0 (H) 06/06/2020       I/O last 3 completed shifts:  In: 1960 [P.O.:720; I.V.:1240]  Out: -     Cardiographics:  ECG/EMG Results (last 24 hours)     Procedure Component Value Units Date/Time    SCANNED EKG [[id number]] Resulted:  06/05/20      Updated:  06/08/20 1147        Results for orders placed during the hospital encounter of 06/05/20   Transthoracic Echo Complete With Contrast if Necessary Per Protocol    Narrative · The left  ventricular cavity is mild-to-moderately dilated.  · The following left ventricular wall segments are hypokinetic: mid   inferolateral and mid inferoseptal.  · Mild mitral valve regurgitation is present  · Estimated EF appears to be in the range of 31 - 35%. Normal left   ventricular wall thickness noted. The following left ventricular wall   segments are hypokinetic: mid inferolateral and mid inferoseptal. The left   ventricular cavity is mild-to-moderately dilated.          @[email or web address]    Xr Chest 1 View    Result Date: 6/5/2020  Small patchy area of opacification right lung base, may represent infiltrate or less likely asymmetric edema. Short-term interval follow-up suggested to ensure complete resolution Electronically signed by:  Vivienne Whittaker MD  6/5/2020 6:44 PM CDT Workstation: [id number]    Us Carotid Bilateral    Result Date: 6/9/2020  Narrowing in the right carotid bifurcation, proximal internal carotid artery less than 50%. Areas of narrowing left carotid bifurcation, internal carotid artery in the range of 50-60%. Electronically signed by:  Jorge Martienz MD  6/9/2020 8:58 AM CDT Workstation: [id number]    Us Renal Bilateral    Result Date: 6/6/2020  1. Normal appearance of the right kidney 2. Simple appearing left renal cysts 3. Prostate protrudes into the base of the bladder, and is probably at least somewhat enlarged. Electronically signed by:  Vivienne Whittaker MD  6/6/2020 2:37 PM CDT Workstation: [id number]    Us Vein Mapping Bilateral    Result Date: 6/9/2020  1. Venous mapping study of both lower extremities with measurements described above. Electronically signed by:  Jorge Martinez MD  6/9/2020 8:55 AM CDT Workstation: [id number]      Medication Review:     Current Facility-Administered Medications:   •  acetaminophen (TYLENOL) tablet 650 mg, 650 mg, Oral, Q4H PRN, April Petersen MD  •  ALPRAZolam (XANAX) tablet 0.25 mg, 0.25 mg, Oral, TID PRN, April Petersen MD, 0.25 mg at 06/09/20 0849  •  aspirin  chewable tablet 81 mg, 81 mg, Oral, Daily, April Petersen MD, 81 mg at 06/09/20 1003  •  atorvastatin (LIPITOR) tablet 40 mg, 40 mg, Oral, Nightly, April Petersen MD, 40 mg at 06/08/20 2039  •  budesonide-formoterol (SYMBICORT) 80-4.5 MCG/ACT inhaler 2 puff, 2 puff, Inhalation, BID - RT, April Petersen MD, 2 puff at 06/09/20 0802  •  diphenhydrAMINE (BENADRYL) injection 25 mg, 25 mg, Intravenous, Q6H PRN, April Petersen MD  •  fentaNYL citrate (PF) (SUBLIMAZE) injection 25 mcg, 25 mcg, Intravenous, Q1H PRN **AND** naloxone (NARCAN) injection 0.4 mg, 0.4 mg, Intravenous, Q5 Min PRN, April Petersen MD  •  hydrALAZINE (APRESOLINE) injection 20 mg, 20 mg, Intravenous, Q6H PRN, April Petersen MD  •  HYDROcodone-acetaminophen (NORCO) 5-325 MG per tablet 1 tablet, 1 tablet, Oral, Q4H PRN, April Petersen MD  •  ipratropium-albuterol (DUO-NEB) nebulizer solution 3 mL, 3 mL, Nebulization, Q6H PRN, April Petersen MD  •  metoprolol tartrate (LOPRESSOR) tablet 25 mg, 25 mg, Oral, Q12H, April Petersen MD, 25 mg at 06/09/20 1003  •  morphine injection 2 mg, 2 mg, Intravenous, Q4H PRN **AND** naloxone (NARCAN) injection 0.4 mg, 0.4 mg, Intravenous, Q5 Min PRN, April Petersen MD  •  nitroglycerin (NITROSTAT) ointment 1 inch, 1 inch, Topical, Q6H PRN, April Petersen MD  •  ondansetron (ZOFRAN) injection 4 mg, 4 mg, Intravenous, Q6H PRN, April Petersen MD, 4 mg at 06/06/20 2234  •  ranolazine (RANEXA) 12 hr tablet 1,000 mg, 1,000 mg, Oral, Q12H, April Petersen MD, 1,000 mg at 06/09/20 1003  •  sodium chloride 0.9 % flush 10 mL, 10 mL, Intravenous, PRN, April Petersen MD  •  sodium chloride 0.9 % flush 10 mL, 10 mL, Intravenous, Q12H, April Petersen MD, 10 mL at 06/08/20 2040  •  sodium chloride 0.9 % flush 10 mL, 10 mL, Intravenous, PRNNicola Dolph Martel, MD  •  sodium chloride 0.9 % flush 10 mL, 10 mL, Intravenous, PRNNicola Dolph Martel,  MD  •  sodium chloride 0.9 % flush 3 mL, 3 mL, Intravenous, Q12H, April Petersen MD  •  sodium chloride 0.9 % infusion, 100 mL/hr, Intravenous, Continuous, Aprli Petersen MD, Last Rate: 100 mL/hr at 06/08/20 2230, 100 mL/hr at 06/08/20 2230  •  temazepam (RESTORIL) capsule 7.5 mg, 7.5 mg, Oral, Nightly PRN, April Petersen MD    Assessment/Plan       NSTEMI (non-ST elevated myocardial infarction) (CMS/[facility])    Precordial pain    Essential hypertension    COPD (chronic obstructive pulmonary disease) (CMS/[facility])    Coronary artery disease involving native coronary artery of native heart with unstable angina pectoris (CMS/[facility])    NSTEMI: echocardiogram as well showed some component of ICM with LV dysfunction with a reduced EF to 35%. No s/s of HF upon exam, VSS.   S/p LHC performed yesterday with Dr. Petersen was found to have MVD. Severe symptomatic CAD not amendable for PCI consideration.  Coronary Artery Bypass Grafting considered pending Myocardial viability study. CTVS saw patient yesterday and patient will continue with pre-op testing.   -81mg ASA  -Atorvastatin 40mg  -Metoprolol 25mg BID  -Ranexa 1000mg BID  Recommended daily weight monitoring. Information provided.  Recommended restricted dietary sodium intake of 2g/day  Fluid restrictions of 2L/day.  Discussed patient action plan for heart failure;  Recommended avoiding NSAIDs use.       DWIGHT in the setting of CKDIII: Baseline unknown. Creatinine improved today.  -ARB/Diuretic on hold. If able will need to restart before d/c.   -AM CMP    COPD: hx of coal miners pneumoconiosis; Management per primary      Plan for disposition: Continue 3w              This document has been electronically signed by ZHANNA Burrell on June 9, 2020 10:24

## 2024-12-10 ENCOUNTER — OFFICE VISIT (OUTPATIENT)
Dept: FAMILY MEDICINE CLINIC | Facility: CLINIC | Age: 29
End: 2024-12-10
Payer: COMMERCIAL

## 2024-12-10 VITALS
BODY MASS INDEX: 24.73 KG/M2 | DIASTOLIC BLOOD PRESSURE: 78 MMHG | SYSTOLIC BLOOD PRESSURE: 130 MMHG | HEART RATE: 84 BPM | OXYGEN SATURATION: 99 % | TEMPERATURE: 97.6 F | HEIGHT: 69 IN | WEIGHT: 167 LBS

## 2024-12-10 DIAGNOSIS — J06.9 UPPER RESPIRATORY TRACT INFECTION, UNSPECIFIED TYPE: Primary | ICD-10-CM

## 2024-12-10 LAB
SARS-COV-2 AG UPPER RESP QL IA: NEGATIVE
SL AMB POCT RAPID FLU A: NORMAL
SL AMB POCT RAPID FLU B: NORMAL
VALID CONTROL: NORMAL

## 2024-12-10 PROCEDURE — 87804 INFLUENZA ASSAY W/OPTIC: CPT | Performed by: FAMILY MEDICINE

## 2024-12-10 PROCEDURE — 99213 OFFICE O/P EST LOW 20 MIN: CPT | Performed by: FAMILY MEDICINE

## 2024-12-10 PROCEDURE — 87811 SARS-COV-2 COVID19 W/OPTIC: CPT | Performed by: FAMILY MEDICINE

## 2024-12-10 NOTE — PROGRESS NOTES
"Name: Rica Hopper      : 1995      MRN: [id number]  Encounter Provider: Carri Martins MD  Encounter Date: 12/10/2024   Encounter department: [facility]  :  Assessment & Plan  Upper respiratory tract infection, unspecified type  Counseled the patient regarding supportive care.  They are to call or return to the office if not improving.  If symptoms suddenly worsen, would call in Augmentin for suspected superimposed sinusitis   Orders:  •  POCT Rapid Covid Ag  •  POCT rapid flu A and B           History of Present Illness   Chief Complaint   Patient presents with   • Cough     Started Saturday- ear pain in both ears, congestion, sinus pressure, headache, took otc medication. Slight cough.       HPI Patient presents with runny nose, ear pain, cough, sinus pressure since Saturday. Denies fever, chills, body aches, diarrhea. Sister with ear/sinus infection.   Review of Systems   Constitutional:  Negative for chills and fever.   HENT:  Positive for congestion, postnasal drip, rhinorrhea and sinus pressure.    Eyes:  Negative for pain and visual disturbance.   Respiratory:  Positive for cough. Negative for shortness of breath.    Cardiovascular:  Negative for chest pain and palpitations.   Gastrointestinal:  Negative for abdominal pain and nausea.   Genitourinary:  Negative for dysuria.   Musculoskeletal:  Negative for arthralgias and myalgias.   Skin:  Negative for rash and wound.   Neurological:  Negative for dizziness and headaches.   All other systems reviewed and are negative.      Objective   /78   Pulse 84   Temp 97.6 °F (36.4 °C)   Ht 5' 9\" (1.753 m)   Wt 75.8 kg (167 lb)   LMP 2024 (Exact Date)   SpO2 99%   BMI 24.66 kg/m²      Physical Exam  Vitals and nursing note reviewed.   Constitutional:       General: She is not in acute distress.     Appearance: She is well-developed. She is not ill-appearing.   HENT:      Head: Normocephalic and " atraumatic.      Right Ear: Tympanic membrane, ear canal and external ear normal.      Left Ear: Tympanic membrane, ear canal and external ear normal.      Ears:      Comments: Serous effusions b/l     Nose: Nose normal.      Mouth/Throat:      Mouth: Mucous membranes are moist.      Pharynx: No oropharyngeal exudate or posterior oropharyngeal erythema.   Eyes:      Conjunctiva/sclera: Conjunctivae normal.   Neck:      Trachea: No tracheal deviation.   Cardiovascular:      Rate and Rhythm: Normal rate and regular rhythm.      Pulses: Normal pulses.      Heart sounds: Normal heart sounds. No murmur heard.  Pulmonary:      Effort: Pulmonary effort is normal.      Breath sounds: Normal breath sounds. No wheezing, rhonchi or rales.   Abdominal:      Tenderness: There is no abdominal tenderness.   Lymphadenopathy:      Cervical: Cervical adenopathy present.   Skin:     General: Skin is warm and dry.      Capillary Refill: Capillary refill takes less than 2 seconds.      Findings: No rash.   Neurological:      Mental Status: She is alert.      Cranial Nerves: No cranial nerve deficit.

## 2024-12-16 ENCOUNTER — TELEPHONE (OUTPATIENT)
Age: 29
End: 2024-12-16

## 2024-12-16 DIAGNOSIS — J01.90 ACUTE SINUSITIS, RECURRENCE NOT SPECIFIED, UNSPECIFIED LOCATION: Primary | ICD-10-CM

## 2024-12-16 NOTE — TELEPHONE ENCOUNTER
Pt called stating she was told by Dr. Martins if her symptoms returned to reach out to write her a new prescription. Pt states her congestion and ear pain have returned.     Please advise.

## 2025-02-26 ENCOUNTER — OFFICE VISIT (OUTPATIENT)
Dept: FAMILY MEDICINE CLINIC | Facility: CLINIC | Age: 30
End: 2025-02-26
Payer: COMMERCIAL

## 2025-02-26 VITALS
BODY MASS INDEX: 23.76 KG/M2 | OXYGEN SATURATION: 99 % | HEART RATE: 91 BPM | TEMPERATURE: 97.5 F | WEIGHT: 160.4 LBS | HEIGHT: 69 IN | SYSTOLIC BLOOD PRESSURE: 110 MMHG | DIASTOLIC BLOOD PRESSURE: 80 MMHG

## 2025-02-26 DIAGNOSIS — R68.89 FLU-LIKE SYMPTOMS: ICD-10-CM

## 2025-02-26 DIAGNOSIS — J06.9 UPPER RESPIRATORY TRACT INFECTION, UNSPECIFIED TYPE: Primary | ICD-10-CM

## 2025-02-26 LAB
SARS-COV-2 AG UPPER RESP QL IA: NEGATIVE
SL AMB POCT RAPID FLU A: NEGATIVE
SL AMB POCT RAPID FLU B: NEGATIVE
VALID CONTROL: NORMAL

## 2025-02-26 PROCEDURE — 87804 INFLUENZA ASSAY W/OPTIC: CPT | Performed by: FAMILY MEDICINE

## 2025-02-26 PROCEDURE — 87811 SARS-COV-2 COVID19 W/OPTIC: CPT | Performed by: FAMILY MEDICINE

## 2025-02-26 PROCEDURE — 99213 OFFICE O/P EST LOW 20 MIN: CPT | Performed by: FAMILY MEDICINE

## 2025-02-26 RX ORDER — BROMPHENIRAMINE MALEATE, PSEUDOEPHEDRINE HYDROCHLORIDE, AND DEXTROMETHORPHAN HYDROBROMIDE 2; 30; 10 MG/5ML; MG/5ML; MG/5ML
5 SYRUP ORAL 4 TIMES DAILY PRN
Qty: 180 ML | Refills: 0 | Status: SHIPPED | OUTPATIENT
Start: 2025-02-26

## 2025-02-26 RX ORDER — CEFUROXIME AXETIL 500 MG/1
500 TABLET ORAL EVERY 12 HOURS SCHEDULED
Qty: 20 TABLET | Refills: 0 | Status: SHIPPED | OUTPATIENT
Start: 2025-02-26 | End: 2025-03-08

## 2025-02-28 NOTE — PROGRESS NOTES
Patient ID: Rica Hopper is a 29 y.o. female.    Diagnoses and all orders for this visit:    Upper respiratory tract infection, unspecified type  -     brompheniramine-pseudoephedrine-DM 30-2-10 MG/5ML syrup; Take 5 mL by mouth 4 (four) times a day as needed for congestion or cough  -     cefuroxime (CEFTIN) 500 mg tablet; Take 1 tablet (500 mg total) by mouth every 12 (twelve) hours for 10 days    Flu-like symptoms  -     POCT Rapid Covid Ag  -     POCT rapid flu A and B  Diagnoses and all orders for this visit:    Upper respiratory tract infection, unspecified type  -     brompheniramine-pseudoephedrine-DM 30-2-10 MG/5ML syrup; Take 5 mL by mouth 4 (four) times a day as needed for congestion or cough  -     cefuroxime (CEFTIN) 500 mg tablet; Take 1 tablet (500 mg total) by mouth every 12 (twelve) hours for 10 days    Flu-like symptoms  -     POCT Rapid Covid Ag  -     POCT rapid flu A and B  If pt is not feeling better in 72 hrs, she is to call.        HPI: 29 y.o.female presenting with 5 day history of sore throat, nasal congestion, ear pain,pnd and cough.  Pt denies any fever, chills, or body aches.  Her covid and influenza testing were negative.      SUBJECTIVE    Family History   Problem Relation Age of Onset    Diabetes type II Mother         without complications    Heart attack Mother     Hypertension Father         benign essential     Diabetes Maternal Grandmother     Ovarian cancer Other     Diabetes type II Other         without complications     Social History     Socioeconomic History    Marital status: Single     Spouse name: Not on file    Number of children: Not on file    Years of education: Not on file    Highest education level: Not on file   Occupational History    Occupation: Student   Tobacco Use    Smoking status: Never     Passive exposure: Never    Smokeless tobacco: Never   Vaping Use    Vaping status: Never Used   Substance and Sexual Activity    Alcohol use: No    Drug use: No     Sexual activity: Yes     Partners: Male     Birth control/protection: Condom Male, OCP   Other Topics Concern    Not on file   Social History Narrative    Feels safe at home     Social Drivers of Health     Financial Resource Strain: Not on file   Food Insecurity: Not on file   Transportation Needs: Not on file   Physical Activity: Not on file   Stress: Not on file   Social Connections: Not on file   Intimate Partner Violence: Not on file   Housing Stability: Not on file     Past Medical History:   Diagnosis Date    Allergic     Albuterol    GERD (gastroesophageal reflux disease)     Hearing impaired person, bilateral     HL (hearing loss)     Narcolepsy      Past Surgical History:   Procedure Laterality Date    TYMPANOSTOMY TUBE PLACEMENT      last assessed: 04/06/2017; remove chornic fluid    WISDOM TOOTH EXTRACTION       Allergies   Allergen Reactions    Albuterol Other (See Comments) and Rash     unknown       Current Outpatient Medications:     brompheniramine-pseudoephedrine-DM 30-2-10 MG/5ML syrup, Take 5 mL by mouth 4 (four) times a day as needed for congestion or cough, Disp: 180 mL, Rfl: 0    Ca, Mg, K, and Na Oxybates (Xywav) 500 MG/ML SOLN, Take 3 g by mouth 2 (two) times a day First dose (bedtime):3 g + Second dose (2.5-4 hrs later): 3 g = 6 g Total Nightly Dose, Disp: 360 mL, Rfl: 5    cefuroxime (CEFTIN) 500 mg tablet, Take 1 tablet (500 mg total) by mouth every 12 (twelve) hours for 10 days, Disp: 20 tablet, Rfl: 0    cetirizine (ZyrTEC) 10 mg tablet, Take 10 mg by mouth daily, Disp: , Rfl:     Cholecalciferol 25 MCG (1000 UT) capsule, Take by mouth, Disp: , Rfl:     fluticasone (FLONASE) 50 mcg/act nasal spray, 2 sprays into each nostril daily 2 sprays bilat qd, Disp: 11.1 mL, Rfl: 1    hydrocortisone 1 % ointment, , Disp: , Rfl:     Kariva 0.15-0.02/0.01 MG (21/5) per tablet, Take 1 tablet by mouth daily, Disp: , Rfl:     Nuvigil 150 MG tablet, Take 1 tablet (150 mg total) by mouth daily, Disp: 90  "tablet, Rfl: 1    pantoprazole (PROTONIX) 40 mg tablet, TAKE 1 TABLET(40 MG) BY MOUTH DAILY, Disp: 100 tablet, Rfl: 1    tretinoin (RETIN-A) 0.025 % cream, , Disp: , Rfl:     triamcinolone (KENALOG) 0.1 % cream, as needed, Disp: , Rfl:     Review of Systems  Constitutional:     Denies fever, chills ,fatigue ,weakness ,weight loss, weight gain     ENT: Denies loss of hearing ,nosebleed, nasal discharge ,hoarseness; but admits to nasal congestion , sore throat and ear pain.    Pulmonary: Denies shortness of breath ,dyspnea on exertion, orthopnea ; but admits to cough and pnd  Cardiovascular:  Denies bradycardia , tachycardia  ,palpations, lower extremity edema leg, claudication  Breast:  Denies new or changing breast lumps ,nipple discharge ,nipple changes  Abdomen:  Denies abdominal pain , anorexia , indigestion, nausea, vomiting, constipation, diarrhea  Musculoskeletal: Denies myalgias, arthralgias, joint swelling, joint stiffness , limb pain, limb swelling  Lymph: + swollen glands  Gu: Denies polyuria or dysuria  Skin: Denies skin rash, skin lesion, skin wound, itching, dry skin  Neuro: Denies headache, numbness, tingling, confusion, loss of consciousness, dizziness, vertigo  Psychiatric: Denies feelings of depression, suicidal ideation, anxiety, sleep disturbances    OBJECTIVE  /80   Pulse 91   Temp 97.5 °F (36.4 °C)   Ht 5' 9\" (1.753 m)   Wt 72.8 kg (160 lb 6.4 oz)   SpO2 99%   BMI 23.69 kg/m²   Constitutional:   NAD, well appearing and well nourished     ENT:   Conjunctiva and lids: no injection, edema, or discharge    Pupils and iris: AUGUSTINA bilaterally  External inspection of ears and nose: normal without deformities or discharge.     Otoscopic exam: Canals patent without erythema, tm dull and erythematous, effusions bilaterally   Nasal mucosa, septum and turbinates: + turbinate injection, nasal discharge         Oropharynx:  Moist mucosa, normal tongue and tonsils without lesions.+ erythema and " injection of posterior pharynx with pnd    Pulmonary:Respiratory effort normal rate and rhythm, no increased work of breathing. Auscultation of lungs:  Clear bilaterally with no adventitious breath sounds     Cardiovascular: regular rate and rhythm, S1 and S2, no murmur, no edema and/or varicosities of LE     Abdomen: Soft and nontender with + bowel sounds  No heptomegaly or splenomegaly     Gu: no suprapubic tenderness or CVA tenderness  Lymphatic: + anterior  cervical lymphadenopathy      Musculoskeletal:  Gait and station: Normal gait      Digits and nails normal without clubbing or cyanosis      Inspection/palpation of joints, bones, and muscles:  No joint tenderness, swelling, full active and passive range of motion       Skin: Normal skin turgor and no rashes      Neuro:    Normal reflexes  Pych:   alert and oriented to person, place and time     normal mood and affect          Answers submitted by the patient for this visit:  Cough Questionnaire (Submitted on 2/26/2025)  Chief Complaint: Cough  Chronicity: new  Onset: in the past 7 days  Progression since onset: waxing and waning  Frequency: every few minutes  Cough characteristics: non-productive  chest pain: No  chills: No  ear congestion: No  ear pain: Yes  fever: No  headaches: No  heartburn: No  hemoptysis: No  myalgias: No  nasal congestion: Yes  postnasal drip: Yes  rash: No  rhinorrhea: Yes  shortness of breath: No  sore throat: No  sweats: No  weight loss: No  wheezing: No  Aggravated by: nothing

## 2025-04-16 ENCOUNTER — OFFICE VISIT (OUTPATIENT)
Dept: FAMILY MEDICINE CLINIC | Facility: CLINIC | Age: 30
End: 2025-04-16
Payer: COMMERCIAL

## 2025-04-16 VITALS
OXYGEN SATURATION: 98 % | SYSTOLIC BLOOD PRESSURE: 110 MMHG | WEIGHT: 162.2 LBS | DIASTOLIC BLOOD PRESSURE: 70 MMHG | TEMPERATURE: 96.6 F | HEART RATE: 78 BPM | BODY MASS INDEX: 23.95 KG/M2

## 2025-04-16 DIAGNOSIS — K21.9 GASTROESOPHAGEAL REFLUX DISEASE WITHOUT ESOPHAGITIS: ICD-10-CM

## 2025-04-16 DIAGNOSIS — J01.90 ACUTE SINUSITIS, RECURRENCE NOT SPECIFIED, UNSPECIFIED LOCATION: Primary | ICD-10-CM

## 2025-04-16 PROCEDURE — 99213 OFFICE O/P EST LOW 20 MIN: CPT | Performed by: FAMILY MEDICINE

## 2025-04-16 RX ORDER — PANTOPRAZOLE SODIUM 40 MG/1
40 TABLET, DELAYED RELEASE ORAL DAILY
Qty: 100 TABLET | Refills: 1 | Status: SHIPPED | OUTPATIENT
Start: 2025-04-16

## 2025-04-16 RX ORDER — PREDNISONE 10 MG/1
TABLET ORAL
Qty: 26 TABLET | Refills: 0 | Status: SHIPPED | OUTPATIENT
Start: 2025-04-16

## 2025-04-16 RX ORDER — CEFUROXIME AXETIL 500 MG/1
500 TABLET ORAL EVERY 12 HOURS SCHEDULED
Qty: 20 TABLET | Refills: 0 | Status: SHIPPED | OUTPATIENT
Start: 2025-04-16 | End: 2025-04-26

## 2025-04-23 NOTE — PROGRESS NOTES
Patient ID: Rica Hopper is a 29 y.o. female.    Diagnoses and all orders for this visit:    Acute sinusitis, recurrence not specified, unspecified location  -     cefuroxime (CEFTIN) 500 mg tablet; Take 1 tablet (500 mg total) by mouth every 12 (twelve) hours for 10 days  -     predniSONE 10 mg tablet; 3 tabs po bid x2 days, then 2 tabs po bid x2 days, then 1 tab bid x2 days, then 1 daily until done.    Gastroesophageal reflux disease without esophagitis  -     pantoprazole (PROTONIX) 40 mg tablet; Take 1 tablet (40 mg total) by mouth daily    Reviewed with patient plan to treat with above plan.    Patient instructed to call in 72 hours if not feeling better or if symptoms worsen     HPI: 29 y.o.female presenting with symptoms of sinus pain,pressure, nasal congestion, pnd dry cough , ear and throat pain.  Symptoms for the past 1 weeks time.    SUBJECTIVE    Family History   Problem Relation Age of Onset    Diabetes type II Mother         without complications    Heart attack Mother     Hypertension Father         benign essential     Diabetes Maternal Grandmother     Ovarian cancer Other     Diabetes type II Other         without complications     Social History     Socioeconomic History    Marital status: Single     Spouse name: Not on file    Number of children: Not on file    Years of education: Not on file    Highest education level: Not on file   Occupational History    Occupation: Student   Tobacco Use    Smoking status: Never     Passive exposure: Never    Smokeless tobacco: Never   Vaping Use    Vaping status: Never Used   Substance and Sexual Activity    Alcohol use: No    Drug use: No    Sexual activity: Yes     Partners: Male     Birth control/protection: Condom Male, OCP   Other Topics Concern    Not on file   Social History Narrative    Feels safe at home     Social Drivers of Health     Financial Resource Strain: Not on file   Food Insecurity: Not on file   Transportation Needs: Not on file    Physical Activity: Not on file   Stress: Not on file   Social Connections: Not on file   Intimate Partner Violence: Not on file   Housing Stability: Not on file     Past Medical History:   Diagnosis Date    Allergic     Albuterol    GERD (gastroesophageal reflux disease)     Hearing impaired person, bilateral     HL (hearing loss)     Narcolepsy      Past Surgical History:   Procedure Laterality Date    TYMPANOSTOMY TUBE PLACEMENT      last assessed: 04/06/2017; remove chornic fluid    WISDOM TOOTH EXTRACTION       Allergies   Allergen Reactions    Albuterol Other (See Comments) and Rash     unknown       Current Outpatient Medications:     brompheniramine-pseudoephedrine-DM 30-2-10 MG/5ML syrup, Take 5 mL by mouth 4 (four) times a day as needed for congestion or cough, Disp: 180 mL, Rfl: 0    Ca, Mg, K, and Na Oxybates (Xywav) 500 MG/ML SOLN, Take 3 g by mouth 2 (two) times a day First dose (bedtime):3 g + Second dose (2.5-4 hrs later): 3 g = 6 g Total Nightly Dose, Disp: 360 mL, Rfl: 5    cefuroxime (CEFTIN) 500 mg tablet, Take 1 tablet (500 mg total) by mouth every 12 (twelve) hours for 10 days, Disp: 20 tablet, Rfl: 0    cetirizine (ZyrTEC) 10 mg tablet, Take 10 mg by mouth daily, Disp: , Rfl:     Cholecalciferol 25 MCG (1000 UT) capsule, Take by mouth, Disp: , Rfl:     fluticasone (FLONASE) 50 mcg/act nasal spray, 2 sprays into each nostril daily 2 sprays bilat qd, Disp: 11.1 mL, Rfl: 1    hydrocortisone 1 % ointment, , Disp: , Rfl:     Kariva 0.15-0.02/0.01 MG (21/5) per tablet, Take 1 tablet by mouth daily, Disp: , Rfl:     Nuvigil 150 MG tablet, Take 1 tablet (150 mg total) by mouth daily, Disp: 90 tablet, Rfl: 1    pantoprazole (PROTONIX) 40 mg tablet, Take 1 tablet (40 mg total) by mouth daily, Disp: 100 tablet, Rfl: 1    predniSONE 10 mg tablet, 3 tabs po bid x2 days, then 2 tabs po bid x2 days, then 1 tab bid x2 days, then 1 daily until done., Disp: 26 tablet, Rfl: 0    tretinoin (RETIN-A) 0.025 %  cream, , Disp: , Rfl:     triamcinolone (KENALOG) 0.1 % cream, as needed, Disp: , Rfl:     Review of Systems  Constitutional:     Denies fever, chills, fatigue, weakness ,weight loss, weight gain      ENT: Denies earache, loss of hearing, nosebleed, nasal discharge,but complains of nasal congestion, sore throat,hoarseness and sinus pain and pressure    Pulmonary: Denies shortness of breath ,cough , dyspnea on exertionon, orthopnea ,+ PND   Cardiovascular:  Denies bradycardia , tachycardia ,palpations, lower extremity, edema leg, claudication  Breast:  Denies new or changing breast lumps,  nipple discharge, nipple changes,  Abdomen:  Denies abdominal pain , anorexia ,indigestion, nausea ,vomiting, constipation , diarrhea  Musculoskeletal: Denies myalgias, arthralgias, joint swelling, joint stiffness ,limb pain, limb swelling  Lymph:+ swollen glands  Gu: no dysuria or urinary frequency  Skin: Denies skin rash, skin lesion, skin wound, itching,dry skin  Neuro: Denies headache, numbness, tingling, confusion, loss of consciousness, dizziness ,vertigo  Psychiatric: Denies feelings of depression, suicidal ideation, anxiety, sleep disturbances    OBJECTIVE  /70   Pulse 78   Temp (!) 96.6 °F (35.9 °C)   Wt 73.6 kg (162 lb 3.2 oz)   SpO2 98%   BMI 23.95 kg/m²   Constitutional:   NAD, well appearing and well nourished      ENT:   Conjunctiva and lids: no injection, edema, or discharge    Pupils and iris: AUGUSTINA bilaterally   External inspection of ears and nose: normal without deformities or discharge.      Otoscopic exam: Canals patent ; tm are dull, with with erythem and effusions  ENasal mucosa, septum and turbinates: Turbinae injection with discharge   Oropharynx:  Moist mucosa, normal tongue and tonsils without lesions.Erythema and injection  of post pharynx with pnd      Pulmonary:Respiratory effort normal rate and rhythm, no increased work of breathing. Auscultation of lungs:  Clear bilaterally with no  adventitious breath sounds       Cardiovascular: regular rate and rhythm, S1 and S2, no murmur, no edema and/or varicosities of LE      Abdomen: Soft and non-distended    Positive bowel sounds    No heptomegaly or splenomegaly    Lymphatic: Anterior  cervical lymphadenopathy         Muscskeletal:  Gait and station: Normal gait     Digits and nails normal without clubbing or cyanosis     Inspection/palpation of joints, bones, and muscles:  No joint tenderness, swelling, full active and passive range of motion      Gu: no suprabubic tenderness, CVA tenderness or urethral discharge  Skin: Normal skin turgor and no rashes    Neuro:    Normal reflexes   Psych:   alert and oriented to person, place and time  normal mood and affec            Answers submitted by the patient for this visit:  Ear Pain Questionnaire (Submitted on 4/14/2025)  Chief Complaint: Ear pain  Affected ear: both  Chronicity: new  Onset: yesterday  Progression since onset: unchanged  Frequency: every few hours  Fever: no fever  Pain - numeric: 3/10  abdominal pain: No  cough: Yes  diarrhea: No  ear discharge: No  headaches: Yes  hearing loss: No  neck pain: No  rash: No  rhinorrhea: Yes  sore throat: Yes  vomiting: No

## 2025-05-20 ENCOUNTER — TELEPHONE (OUTPATIENT)
Age: 30
End: 2025-05-20

## 2025-05-20 NOTE — TELEPHONE ENCOUNTER
Patient aware she cannot be seen here today. Patient was advised to go to urgent care if she is unable to wait until appt tomorrow morning.

## 2025-05-20 NOTE — TELEPHONE ENCOUNTER
The patient called she has bilateral ear pain that started last night    she has sinus pressure and head congestion    she is scheduled for tomorrow morning but she wanted to know if there is anyway she could be seen today   please advise thank you

## 2025-05-21 ENCOUNTER — OFFICE VISIT (OUTPATIENT)
Dept: FAMILY MEDICINE CLINIC | Facility: CLINIC | Age: 30
End: 2025-05-21
Payer: COMMERCIAL

## 2025-05-21 VITALS
HEART RATE: 80 BPM | HEIGHT: 69 IN | DIASTOLIC BLOOD PRESSURE: 78 MMHG | SYSTOLIC BLOOD PRESSURE: 112 MMHG | BODY MASS INDEX: 24.14 KG/M2 | WEIGHT: 163 LBS | OXYGEN SATURATION: 98 % | TEMPERATURE: 97.2 F

## 2025-05-21 DIAGNOSIS — H69.93 EUSTACHIAN TUBE DYSFUNCTION, BILATERAL: Primary | ICD-10-CM

## 2025-05-21 PROCEDURE — 99213 OFFICE O/P EST LOW 20 MIN: CPT | Performed by: FAMILY MEDICINE

## 2025-05-21 RX ORDER — PREDNISONE 10 MG/1
TABLET ORAL
Qty: 26 TABLET | Refills: 0 | Status: SHIPPED | OUTPATIENT
Start: 2025-05-21

## 2025-05-21 RX ORDER — AZITHROMYCIN 250 MG/1
TABLET, FILM COATED ORAL
Qty: 6 TABLET | Refills: 0 | Status: SHIPPED | OUTPATIENT
Start: 2025-05-21 | End: 2025-05-25

## 2025-05-21 NOTE — PROGRESS NOTES
Patient ID: Rica Hopper is a 30 y.o. female.    Diagnoses and all orders for this visit:    Eustachian tube dysfunction, bilateral  -     predniSONE 10 mg tablet; 3 tabs po bid x2 days, then 2 tabs po bid x2 days, then 1 tab bid x2 days, then 1 daily until done.  -     azithromycin (ZITHROMAX) 250 mg tablet; Take 2 tablets today then 1 tablet daily x 4 days  Reviewed with patient plan to treat with above plan.    Patient instructed to call in 72 hours if not feeling better or if symptoms worsen       HPI: 30 y.o.female presenting with symptoms of ear pressure, crackling of ears and dizziness associated with positional changes.  Symptoms for 3 days.      SUBJECTIVE    Family History   Problem Relation Age of Onset    Diabetes type II Mother         without complications    Heart attack Mother     Hypertension Father         benign essential     Diabetes Maternal Grandmother     Ovarian cancer Other     Diabetes type II Other         without complications     Social History     Socioeconomic History    Marital status: Single     Spouse name: Not on file    Number of children: Not on file    Years of education: Not on file    Highest education level: Not on file   Occupational History    Occupation: Student   Tobacco Use    Smoking status: Never     Passive exposure: Never    Smokeless tobacco: Never   Vaping Use    Vaping status: Never Used   Substance and Sexual Activity    Alcohol use: No    Drug use: No    Sexual activity: Yes     Partners: Male     Birth control/protection: Condom Male, OCP   Other Topics Concern    Not on file   Social History Narrative    Feels safe at home     Social Drivers of Health     Financial Resource Strain: Not on file   Food Insecurity: Not on file   Transportation Needs: Not on file   Physical Activity: Not on file   Stress: Not on file   Social Connections: Not on file   Intimate Partner Violence: Not on file   Housing Stability: Not on file     Past Medical History:   Diagnosis  "Date    Allergic     Albuterol    GERD (gastroesophageal reflux disease)     Hearing impaired person, bilateral     HL (hearing loss)     Narcolepsy      Past Surgical History:   Procedure Laterality Date    TYMPANOSTOMY TUBE PLACEMENT      last assessed: 04/06/2017; remove chornic fluid    WISDOM TOOTH EXTRACTION       Allergies   Allergen Reactions    Albuterol Other (See Comments) and Rash     unknown     Current Medications[1]    Review of Systems  Constitutional:     Denies fever, chills, fatigue, weakness ,weight loss, weight gain       ENT: Denies earache, loss of hearing, nosebleed, nasal discharge,nasal congestion, sore throat,hoarseness, but complains of ear pressure,and crackling    Pulmonary: Denies shortness of breath ,cough , dyspnea on exertionon, orthopnea , PND   Cardiovascular:  Denies bradycardia , tachycardia ,palpations, lower extremity, edema leg, claudication  Breast:  Denies new or changing breast lumps,  nipple discharge, nipple changes,  Abdomen:  Denies abdominal pain , anorexia ,indigestion, nausea ,vomiting, constipation , diarrhea  Musculoskeletal: Denies myalgias, arthralgias, joint swelling, joint stiffness ,limb pain, limb swelling  Lymph:denies swollen glands  Gu: no dysuria or urinary frequency  Skin: Denies skin rash, skin lesion, skin wound, itching,dry skin  Neuro: Denies headache, numbness, tingling, confusion, loss of consciousness, but complains of postitional vertigo  Psychiatric: Denies feelings of depression, suicidal ideation, anxiety, sleep disturbances    OBJECTIVE  /78   Pulse 80   Temp (!) 97.2 °F (36.2 °C)   Ht 5' 9\" (1.753 m)   Wt 73.9 kg (163 lb)   SpO2 98%   BMI 24.07 kg/m²   Constitutional:   NAD, well appearing and well nourished      ENT:   Conjunctiva and lids: no injection, edema, or discharge     Pupils and iris: AUGUSTINA bilaterally    External inspection of ears and nose: normal without deformities or discharge.      Otoscopic exam: Canals patent " with tm dull, no erythema,but large effusions noted bilaterally  ENasal mucosa, septum and turbinates: Turbinae injection with discharge   Oropharynx:  Moist mucosa, normal tongue and tonsils without lesions.Erythema and injection  of post pharynx with pnd      Pulmonary:Respiratory effort normal rate and rhythm, no increased work of breathing. Auscultation of lungs:  Clear bilaterally with no adventitious breath sounds       Cardiovascular: regular rate and rhythm, S1 and S2, no murmur, no edema and/or varicosities of LE      Abdomen: Soft and non-distended     Positive bowel sounds      No heptomegaly or splenomegaly      Lymphatic: Anterior and posterior cervical lymphadenopathy         Muscskeletal:  Gait and station: Normal gait      Digits and nails normal without clubbing or cyanosis       Inspection/palpation of joints, bones, and muscles:  No joint tenderness, swelling, full active and passive range of motion       Gu: no suprabubic tenderness, CVA tenderness or urethral discharge  Skin: Normal skin turgor and no rashes      Neuro:      Normal reflexes     Psych:   alert and oriented to person, place and time     normal mood and affect                  [1]   Current Outpatient Medications:     Armodafinil 200 MG TABS, Take 0.75 tablets (150 mg total) by mouth daily Branded Nuvigil 150 MG is medically necessary., Disp: 90 tablet, Rfl: 3    azithromycin (ZITHROMAX) 250 mg tablet, Take 2 tablets today then 1 tablet daily x 4 days, Disp: 6 tablet, Rfl: 0    brompheniramine-pseudoephedrine-DM 30-2-10 MG/5ML syrup, Take 5 mL by mouth 4 (four) times a day as needed for congestion or cough, Disp: 180 mL, Rfl: 0    Ca, Mg, K, and Na Oxybates (Xywav) 500 MG/ML SOLN, Take 3 g by mouth 2 (two) times a day First dose (bedtime):3 g + Second dose (2.5-4 hrs later): 3 g = 6 g Total Nightly Dose, Disp: 360 mL, Rfl: 5    cetirizine (ZyrTEC) 10 mg tablet, Take 10 mg by mouth in the morning., Disp: , Rfl:     Cholecalciferol  25 MCG (1000 UT) capsule, Take by mouth, Disp: , Rfl:     fluticasone (FLONASE) 50 mcg/act nasal spray, 2 sprays into each nostril daily 2 sprays bilat qd, Disp: 11.1 mL, Rfl: 1    hydrocortisone 1 % ointment, , Disp: , Rfl:     Kariva 0.15-0.02/0.01 MG (21/5) per tablet, Take 1 tablet by mouth in the morning., Disp: , Rfl:     pantoprazole (PROTONIX) 40 mg tablet, Take 1 tablet (40 mg total) by mouth daily, Disp: 100 tablet, Rfl: 1    predniSONE 10 mg tablet, 3 tabs po bid x2 days, then 2 tabs po bid x2 days, then 1 tab bid x2 days, then 1 daily until done., Disp: 26 tablet, Rfl: 0    tretinoin (RETIN-A) 0.025 % cream, , Disp: , Rfl:     triamcinolone (KENALOG) 0.1 % cream, as needed, Disp: , Rfl:

## 2025-07-11 ENCOUNTER — OFFICE VISIT (OUTPATIENT)
Dept: FAMILY MEDICINE CLINIC | Facility: CLINIC | Age: 30
End: 2025-07-11
Payer: COMMERCIAL

## 2025-07-11 VITALS
BODY MASS INDEX: 23.57 KG/M2 | WEIGHT: 159.1 LBS | HEART RATE: 97 BPM | TEMPERATURE: 97.2 F | HEIGHT: 69 IN | OXYGEN SATURATION: 96 % | DIASTOLIC BLOOD PRESSURE: 78 MMHG | SYSTOLIC BLOOD PRESSURE: 120 MMHG

## 2025-07-11 DIAGNOSIS — H65.01 NON-RECURRENT ACUTE SEROUS OTITIS MEDIA OF RIGHT EAR: Primary | ICD-10-CM

## 2025-07-11 PROCEDURE — 99213 OFFICE O/P EST LOW 20 MIN: CPT | Performed by: FAMILY MEDICINE

## 2025-07-11 RX ORDER — PREDNISONE 20 MG/1
20 TABLET ORAL 2 TIMES DAILY WITH MEALS
Qty: 12 TABLET | Refills: 0 | Status: SHIPPED | OUTPATIENT
Start: 2025-07-11 | End: 2025-07-17

## 2025-07-11 NOTE — PROGRESS NOTES
Name: Rica Hopper      : 1995      MRN: [id number]  Encounter Provider: Jose Franocis MD  Encounter Date: 2025   Encounter department: [facility]    Assessment & Plan  Non-recurrent acute serous otitis media of right ear  I reviewed with pt.  Has responded to pred in the past.  Start 20mg bid x 6d. Recheck Monday if not imporving  Orders:  •  predniSONE 20 mg tablet; Take 1 tablet (20 mg total) by mouth 2 (two) times a day with meals for 6 days         History of Present Illness     30-year-old female with a history of hearing loss presents with a 3 to 4-day history of sinus and ear congestion.  She has noted mild nasal congestion but no discharge.  She has had mild sore throat but no cough.  She also denies any fever or chills.  She takes Zyrtec on a regular basis but has not taken anything else for this so far.      Review of Systems   HENT:  Positive for congestion, hearing loss and sinus pressure. Negative for ear discharge, ear pain, sinus pain and sore throat.    Eyes: Negative.    Respiratory: Negative.     Cardiovascular: Negative.      Past Medical History[1]  Past Surgical History[2]  Family History[3]  Social History[4]  Medications[5]  Allergies   Allergen Reactions   • Albuterol Other (See Comments) and Rash     unknown     Immunization History   Administered Date(s) Administered   • COVID-19 PFIZER VACCINE 0.3 ML IM 2021, 2021, 2021   • COVID-19 Pfizer Vac BIVALENT Juice-sucrose 12 Yr+ IM 10/22/2022   • DTaP 1996, 1996, 1996, 1997, 2000   • DTaP 5 1996, 1996, 1996, 1997, 2000   • HPV 2008, 10/03/2008, 2009   • Hep A, adult 10/03/2008, 2009   • Hep A, ped/adol, 2 dose 10/03/2008, 2009   • Hep B, Adolescent or Pediatric 1996, 1996, 1997   • Hep B, adult 1996, 1996, 1997   • HiB 1996, 1996, 1996,  "08/14/1997   • Hib (PRP-OMP) 1996, 1996, 1996, 08/14/1997   • INFLUENZA 12/08/2003, 12/08/2003, 01/11/2005   • IPV 1996, 1996, 11/13/1997, 12/01/2000   • Influenza Injectable, MDCK, Preservative Free, 0.5 mL 11/29/2024   • Influenza, seasonal, injectable 12/08/2003, 01/11/2005   • MMR 08/14/1997, 12/01/2000   • Meningococcal MCV4P 08/03/2007, 07/19/2013   • Tdap 07/25/2008, 08/06/2024   • Tuberculin Skin Test-PPD Intradermal 07/21/2015, 07/29/2015, 05/24/2016, 01/23/2019, 05/06/2020   • Varicella 08/14/1997, 03/14/2007     Objective   /78   Pulse 97   Temp (!) 97.2 °F (36.2 °C)   Ht 5' 9\" (1.753 m)   Wt 72.2 kg (159 lb 1.6 oz)   SpO2 96%   BMI 23.49 kg/m²     Physical Exam  Vitals reviewed.   Constitutional:       Appearance: Normal appearance.   HENT:      Head: Normocephalic.      Right Ear: External ear normal. There is no impacted cerumen.      Left Ear: Tympanic membrane, ear canal and external ear normal.      Ears:      Comments: (+) air fluid level visible in R ear     Nose: Congestion present.      Mouth/Throat:      Mouth: Mucous membranes are moist.     Eyes:      Extraocular Movements: Extraocular movements intact.      Conjunctiva/sclera: Conjunctivae normal.      Pupils: Pupils are equal, round, and reactive to light.       Cardiovascular:      Rate and Rhythm: Normal rate and regular rhythm.      Pulses: Normal pulses.   Pulmonary:      Effort: Pulmonary effort is normal.     Musculoskeletal:      Cervical back: No tenderness.   Lymphadenopathy:      Cervical: No cervical adenopathy.     Skin:     General: Skin is warm.      Capillary Refill: Capillary refill takes less than 2 seconds.     Neurological:      Mental Status: She is alert.                [1]  Past Medical History:  Diagnosis Date   • Allergic     Albuterol   • GERD (gastroesophageal reflux disease)    • Hearing impaired person, bilateral    • HL (hearing loss)    • Narcolepsy    [2]  Past " Surgical History:  Procedure Laterality Date   • TYMPANOSTOMY TUBE PLACEMENT      last assessed: 04/06/2017; remove chornic fluid   • WISDOM TOOTH EXTRACTION     [3]  Family History  Problem Relation Name Age of Onset   • Diabetes type II Mother          without complications   • Heart attack Mother     • Hypertension Father Dwight         benign essential    • Diabetes Maternal Grandmother Rahel    • Ovarian cancer Other Maternal Great Grandmoth    • Diabetes type II Other Maternal Great Grandmoth         without complications   [4]  Social History  Tobacco Use   • Smoking status: Never     Passive exposure: Never   • Smokeless tobacco: Never   Vaping Use   • Vaping status: Never Used   Substance and Sexual Activity   • Alcohol use: No   • Drug use: No   • Sexual activity: Yes     Partners: Male     Birth control/protection: Condom Male, OCP   [5]  Current Outpatient Medications on File Prior to Visit   Medication Sig   • Ca, Mg, K, and Na Oxybates (Xywav) 500 MG/ML SOLN Take 3 g by mouth in the morning and 3 g before bedtime. First dose (bedtime):3 g + Second dose (2.5-4 hrs later): 3 g = 6 g Total Nightly Dose.   • cetirizine (ZyrTEC) 10 mg tablet Take 10 mg by mouth in the morning.   • Cholecalciferol 25 MCG (1000 UT) capsule Take by mouth   • hydrocortisone 1 % ointment    • Kariva 0.15-0.02/0.01 MG (21/5) per tablet Take 1 tablet by mouth in the morning.   • Nuvigil 200 MG TABS Take 1 tablet (200 mg total) by mouth every morning Nuvigil must be branded. Brand necessary.   • pantoprazole (PROTONIX) 40 mg tablet Take 1 tablet (40 mg total) by mouth daily   • tretinoin (RETIN-A) 0.025 % cream    • triamcinolone (KENALOG) 0.1 % cream as needed